# Patient Record
Sex: FEMALE | Race: WHITE | NOT HISPANIC OR LATINO | URBAN - METROPOLITAN AREA
[De-identification: names, ages, dates, MRNs, and addresses within clinical notes are randomized per-mention and may not be internally consistent; named-entity substitution may affect disease eponyms.]

---

## 2018-07-11 ENCOUNTER — OUTPATIENT (OUTPATIENT)
Dept: OUTPATIENT SERVICES | Facility: HOSPITAL | Age: 68
LOS: 1 days | End: 2018-07-11
Payer: COMMERCIAL

## 2018-07-11 VITALS
OXYGEN SATURATION: 100 % | HEIGHT: 65.5 IN | SYSTOLIC BLOOD PRESSURE: 141 MMHG | RESPIRATION RATE: 14 BRPM | TEMPERATURE: 98 F | WEIGHT: 126.99 LBS | DIASTOLIC BLOOD PRESSURE: 90 MMHG | HEART RATE: 72 BPM

## 2018-07-11 DIAGNOSIS — C50.512 MALIGNANT NEOPLASM OF LOWER-OUTER QUADRANT OF LEFT FEMALE BREAST: ICD-10-CM

## 2018-07-11 DIAGNOSIS — Z01.818 ENCOUNTER FOR OTHER PREPROCEDURAL EXAMINATION: ICD-10-CM

## 2018-07-11 PROCEDURE — G0463: CPT

## 2018-07-11 RX ORDER — LIDOCAINE HCL 20 MG/ML
0.2 VIAL (ML) INJECTION ONCE
Qty: 0 | Refills: 0 | Status: DISCONTINUED | OUTPATIENT
Start: 2018-07-18 | End: 2018-08-02

## 2018-07-11 RX ORDER — SODIUM CHLORIDE 9 MG/ML
3 INJECTION INTRAMUSCULAR; INTRAVENOUS; SUBCUTANEOUS EVERY 8 HOURS
Qty: 0 | Refills: 0 | Status: DISCONTINUED | OUTPATIENT
Start: 2018-07-18 | End: 2018-08-02

## 2018-07-11 NOTE — H&P PST ADULT - HISTORY OF PRESENT ILLNESS
69 yo female. h/o HTN.  recent routine screening breast imaging study revealed abnormality, biopsy positive for malignancy. presents to PST scheduled for left breast lumpectomy.

## 2018-07-11 NOTE — H&P PST ADULT - PROBLEM SELECTOR PLAN 1
left Makayla  localization lumpectomy  left axillary sentinel lymph node biopsy  left possible axillary lymph node dissection

## 2018-07-11 NOTE — H&P PST ADULT - ATTENDING COMMENTS
The patient is a 67 year old female recently diagnosed with left breast invasive ductal carcinoma.  She presents to undergo a left murray  localization lumpectomy, left axillary sentinel lymph node biopsy, possible left axillary lymph node dissection.

## 2018-07-11 NOTE — H&P PST ADULT - PROBLEM SELECTOR PROBLEM 1
Malignant neoplasm of lower-outer quadrant of left female breast, unspecified estrogen receptor status

## 2018-07-12 ENCOUNTER — OUTPATIENT (OUTPATIENT)
Dept: OUTPATIENT SERVICES | Facility: HOSPITAL | Age: 68
LOS: 1 days | End: 2018-07-12
Payer: COMMERCIAL

## 2018-07-12 ENCOUNTER — RESULT REVIEW (OUTPATIENT)
Age: 68
End: 2018-07-12

## 2018-07-12 DIAGNOSIS — C50.512 MALIGNANT NEOPLASM OF LOWER-OUTER QUADRANT OF LEFT FEMALE BREAST: ICD-10-CM

## 2018-07-12 LAB
HCT VFR BLD CALC: 38.4 % — SIGNIFICANT CHANGE UP (ref 34.5–45)
HGB BLD-MCNC: 12.6 G/DL — SIGNIFICANT CHANGE UP (ref 11.5–15.5)
MCHC RBC-ENTMCNC: 27.9 PG — SIGNIFICANT CHANGE UP (ref 27–34)
MCHC RBC-ENTMCNC: 32.8 GM/DL — SIGNIFICANT CHANGE UP (ref 32–36)
MCV RBC AUTO: 85.1 FL — SIGNIFICANT CHANGE UP (ref 80–100)
PLATELET # BLD AUTO: 207 K/UL — SIGNIFICANT CHANGE UP (ref 150–400)
RBC # BLD: 4.51 M/UL — SIGNIFICANT CHANGE UP (ref 3.8–5.2)
RBC # FLD: 14.3 % — SIGNIFICANT CHANGE UP (ref 10.3–14.5)
WBC # BLD: 4.85 K/UL — SIGNIFICANT CHANGE UP (ref 3.8–10.5)
WBC # FLD AUTO: 4.85 K/UL — SIGNIFICANT CHANGE UP (ref 3.8–10.5)

## 2018-07-12 PROCEDURE — 85027 COMPLETE CBC AUTOMATED: CPT

## 2018-07-13 ENCOUNTER — TRANSCRIPTION ENCOUNTER (OUTPATIENT)
Age: 68
End: 2018-07-13

## 2018-07-13 ENCOUNTER — OUTPATIENT (OUTPATIENT)
Dept: OUTPATIENT SERVICES | Facility: HOSPITAL | Age: 68
LOS: 1 days | End: 2018-07-13
Payer: COMMERCIAL

## 2018-07-13 ENCOUNTER — APPOINTMENT (OUTPATIENT)
Dept: ULTRASOUND IMAGING | Facility: IMAGING CENTER | Age: 68
End: 2018-07-13
Payer: COMMERCIAL

## 2018-07-13 DIAGNOSIS — R92.8 OTHER ABNORMAL AND INCONCLUSIVE FINDINGS ON DIAGNOSTIC IMAGING OF BREAST: ICD-10-CM

## 2018-07-13 PROCEDURE — 19285 PERQ DEV BREAST 1ST US IMAG: CPT

## 2018-07-13 PROCEDURE — 19285 PERQ DEV BREAST 1ST US IMAG: CPT | Mod: 53,LT

## 2018-07-13 PROCEDURE — C1739: CPT

## 2018-07-17 ENCOUNTER — TRANSCRIPTION ENCOUNTER (OUTPATIENT)
Age: 68
End: 2018-07-17

## 2018-07-18 ENCOUNTER — APPOINTMENT (OUTPATIENT)
Dept: NUCLEAR MEDICINE | Facility: HOSPITAL | Age: 68
End: 2018-07-18

## 2018-07-18 ENCOUNTER — OUTPATIENT (OUTPATIENT)
Dept: OUTPATIENT SERVICES | Facility: HOSPITAL | Age: 68
LOS: 1 days | End: 2018-07-18
Payer: COMMERCIAL

## 2018-07-18 ENCOUNTER — RESULT REVIEW (OUTPATIENT)
Age: 68
End: 2018-07-18

## 2018-07-18 VITALS
OXYGEN SATURATION: 100 % | RESPIRATION RATE: 20 BRPM | HEART RATE: 90 BPM | TEMPERATURE: 98 F | DIASTOLIC BLOOD PRESSURE: 87 MMHG | WEIGHT: 126.99 LBS | HEIGHT: 65.5 IN | SYSTOLIC BLOOD PRESSURE: 141 MMHG

## 2018-07-18 VITALS
SYSTOLIC BLOOD PRESSURE: 110 MMHG | OXYGEN SATURATION: 98 % | HEART RATE: 74 BPM | RESPIRATION RATE: 16 BRPM | DIASTOLIC BLOOD PRESSURE: 60 MMHG

## 2018-07-18 DIAGNOSIS — Z01.818 ENCOUNTER FOR OTHER PREPROCEDURAL EXAMINATION: ICD-10-CM

## 2018-07-18 DIAGNOSIS — C50.512 MALIGNANT NEOPLASM OF LOWER-OUTER QUADRANT OF LEFT FEMALE BREAST: ICD-10-CM

## 2018-07-18 PROCEDURE — 88304 TISSUE EXAM BY PATHOLOGIST: CPT | Mod: 26

## 2018-07-18 PROCEDURE — 88304 TISSUE EXAM BY PATHOLOGIST: CPT

## 2018-07-18 PROCEDURE — 88307 TISSUE EXAM BY PATHOLOGIST: CPT

## 2018-07-18 PROCEDURE — 76098 X-RAY EXAM SURGICAL SPECIMEN: CPT | Mod: 26

## 2018-07-18 PROCEDURE — A9541: CPT

## 2018-07-18 PROCEDURE — 76098 X-RAY EXAM SURGICAL SPECIMEN: CPT

## 2018-07-18 PROCEDURE — 19301 PARTIAL MASTECTOMY: CPT | Mod: LT

## 2018-07-18 PROCEDURE — 38525 BIOPSY/REMOVAL LYMPH NODES: CPT | Mod: LT

## 2018-07-18 PROCEDURE — 38792 RA TRACER ID OF SENTINL NODE: CPT

## 2018-07-18 PROCEDURE — 88307 TISSUE EXAM BY PATHOLOGIST: CPT | Mod: 26

## 2018-07-18 RX ORDER — CELECOXIB 200 MG/1
200 CAPSULE ORAL ONCE
Qty: 0 | Refills: 0 | Status: COMPLETED | OUTPATIENT
Start: 2018-07-18 | End: 2018-07-18

## 2018-07-18 RX ORDER — ACETAMINOPHEN 500 MG
1000 TABLET ORAL ONCE
Qty: 0 | Refills: 0 | Status: COMPLETED | OUTPATIENT
Start: 2018-07-18 | End: 2018-07-18

## 2018-07-18 RX ORDER — HYDROMORPHONE HYDROCHLORIDE 2 MG/ML
0.25 INJECTION INTRAMUSCULAR; INTRAVENOUS; SUBCUTANEOUS
Qty: 0 | Refills: 0 | Status: DISCONTINUED | OUTPATIENT
Start: 2018-07-18 | End: 2018-07-18

## 2018-07-18 RX ORDER — OXYCODONE HYDROCHLORIDE 5 MG/1
5 TABLET ORAL ONCE
Qty: 0 | Refills: 0 | Status: DISCONTINUED | OUTPATIENT
Start: 2018-07-18 | End: 2018-07-18

## 2018-07-18 RX ORDER — CELECOXIB 200 MG/1
200 CAPSULE ORAL ONCE
Qty: 0 | Refills: 0 | Status: DISCONTINUED | OUTPATIENT
Start: 2018-07-18 | End: 2018-08-02

## 2018-07-18 RX ORDER — ONDANSETRON 8 MG/1
4 TABLET, FILM COATED ORAL ONCE
Qty: 0 | Refills: 0 | Status: DISCONTINUED | OUTPATIENT
Start: 2018-07-18 | End: 2018-08-02

## 2018-07-18 RX ORDER — SODIUM CHLORIDE 9 MG/ML
1000 INJECTION, SOLUTION INTRAVENOUS
Qty: 0 | Refills: 0 | Status: DISCONTINUED | OUTPATIENT
Start: 2018-07-18 | End: 2018-08-02

## 2018-07-18 RX ADMIN — Medication 1000 MILLIGRAM(S): at 07:35

## 2018-07-18 RX ADMIN — CELECOXIB 200 MILLIGRAM(S): 200 CAPSULE ORAL at 07:34

## 2018-07-18 NOTE — ASU PREOP CHECKLIST - TO WHOM
WILIAN Padilla from WILIAN Lu @ 31 WILIAN Padilla from WILIAN Lu @ 0835/ Report received from DARRICK Burrows RN Admitting

## 2018-07-18 NOTE — ASU DISCHARGE PLAN (ADULT/PEDIATRIC). - MEDICATION SUMMARY - MEDICATIONS TO TAKE
I will START or STAY ON the medications listed below when I get home from the hospital:    supplements: MVI, calcium, lutein  -- Indication: For home med    losartan-hydroCHLOROthiazide 100 mg-25 mg oral tablet  -- 1 tab(s) by mouth once a day  -- Indication: For home med

## 2018-07-18 NOTE — ASU DISCHARGE PLAN (ADULT/PEDIATRIC). - SPECIAL INSTRUCTIONS
Please refer to post-operative instructions form     WOUND: Please keep incisions clean and dry. Please do not scrub or rub incisions. Please to do not apply lotion or powder on incisions.   BATH: Please do not submerge wound under water for at least 1 week. You may shower.  ACTIVITY: No heavy lifting or straining until your follow up appointment. Otherwise, you may return to your usual level of physical activity. If you are taking narcotic pain medication (such as Percocet) DO NOT drive a car, operate machinery or make important decisions.  DIET: Return to your usual diet.  NOTIFY YOUR SURGEON IF: You have any bleeding that does not stop, any pus draining from your wound(s), any fever (over 100.4 F) or chills, persistent nausea/vomiting, persistent diarrhea, or if your pain is not controlled on your discharge pain medications.  FOLLOW-UP: Please follow-up with your surgeon, within 7-10days  following discharge- please call to schedule an appointment.

## 2018-07-18 NOTE — BRIEF OPERATIVE NOTE - POST-OP DX
Malignant neoplasm of lower-outer quadrant of left breast of female, estrogen receptor positive  07/18/2018  left 4:00  Active  Palleschi, Susan M

## 2018-07-18 NOTE — BRIEF OPERATIVE NOTE - SPECIMENS
left axillary sentinel lymph node x  , left 4:00 breast lumpectomy, left 4:00 breast core biopsy scar

## 2018-07-18 NOTE — ASU DISCHARGE PLAN (ADULT/PEDIATRIC). - FOLLOWUP APPOINTMENT CLINIC/PHYSICIAN
Please call to make follow-up appointment with Dr. Palleschi within 7-10days   Office Contact: [phone]

## 2018-07-18 NOTE — PRE-ANESTHESIA EVALUATION ADULT - NSANTHOSAYNRD_GEN_A_CORE
No. LUCILA screening performed.  STOP BANG Legend: 0-2 = LOW Risk; 3-4 = INTERMEDIATE Risk; 5-8 = HIGH Risk

## 2018-07-18 NOTE — BRIEF OPERATIVE NOTE - PROCEDURE
<<-----Click on this checkbox to enter Procedure Lumpectomy of left breast with sentinel node biopsy  07/18/2018  left 4:00 with murray  localization  Active  [facility]

## 2018-07-20 LAB — SURGICAL PATHOLOGY STUDY: SIGNIFICANT CHANGE UP

## 2018-08-28 ENCOUNTER — OUTPATIENT (OUTPATIENT)
Dept: OUTPATIENT SERVICES | Facility: HOSPITAL | Age: 68
LOS: 1 days | Discharge: HOME | End: 2018-08-28
Payer: COMMERCIAL

## 2018-08-28 ENCOUNTER — APPOINTMENT (OUTPATIENT)
Dept: RADIATION ONCOLOGY | Facility: CLINIC | Age: 68
End: 2018-08-28

## 2018-08-28 VITALS
WEIGHT: 126.87 LBS | SYSTOLIC BLOOD PRESSURE: 155 MMHG | HEART RATE: 75 BPM | OXYGEN SATURATION: 97 % | RESPIRATION RATE: 17 BRPM | BODY MASS INDEX: 21.14 KG/M2 | HEIGHT: 65 IN | DIASTOLIC BLOOD PRESSURE: 92 MMHG | TEMPERATURE: 98.5 F

## 2018-08-28 DIAGNOSIS — Z87.39 PERSONAL HISTORY OF OTHER DISEASES OF THE MUSCULOSKELETAL SYSTEM AND CONNECTIVE TISSUE: ICD-10-CM

## 2018-08-28 DIAGNOSIS — I10 ESSENTIAL (PRIMARY) HYPERTENSION: ICD-10-CM

## 2018-08-28 DIAGNOSIS — Z87.891 PERSONAL HISTORY OF NICOTINE DEPENDENCE: ICD-10-CM

## 2018-08-28 PROCEDURE — 77262 THER RADIOLOGY TX PLNG INTRM: CPT

## 2018-08-28 RX ORDER — ANASTROZOLE TABLETS 1 MG/1
1 TABLET ORAL DAILY
Refills: 0 | Status: ACTIVE | COMMUNITY
Start: 2018-08-28

## 2018-09-04 PROCEDURE — 77290 THER RAD SIMULAJ FIELD CPLX: CPT | Mod: 26

## 2018-09-04 PROCEDURE — 77333 RADIATION TREATMENT AID(S): CPT | Mod: 26

## 2018-09-11 PROCEDURE — 77300 RADIATION THERAPY DOSE PLAN: CPT | Mod: 26

## 2018-09-11 PROCEDURE — 77334 RADIATION TREATMENT AID(S): CPT | Mod: 26

## 2018-09-11 PROCEDURE — 77295 3-D RADIOTHERAPY PLAN: CPT | Mod: 26

## 2018-09-14 PROCEDURE — 77280 THER RAD SIMULAJ FIELD SMPL: CPT | Mod: 26

## 2018-09-19 PROCEDURE — G6017: CPT

## 2018-09-20 PROCEDURE — G6017: CPT

## 2018-09-21 PROCEDURE — G6017: CPT

## 2018-09-24 PROCEDURE — G6017: CPT

## 2018-09-25 PROCEDURE — 77427 RADIATION TX MANAGEMENT X5: CPT

## 2018-09-25 PROCEDURE — G6017: CPT

## 2018-09-26 PROCEDURE — G6017: CPT

## 2018-09-27 PROCEDURE — G6017: CPT

## 2018-09-28 PROCEDURE — G6017: CPT

## 2018-10-01 PROCEDURE — G6017: CPT

## 2018-10-01 NOTE — HISTORY OF PRESENT ILLNESS
[FreeTextEntry1] : Ms. SHAYLA RODRIGUEZ is a 68 year old female with a diagnosis of pT1b pN0 ER/IN positive, HER-2 negative invasive duct carcinoma of the left breast She received a s Makayla  lumpectomy and left axillary sentinel lymph node biopsy and is currently receiving hollie adjuvant radiation to the left breast\par \par She is feeling well and denies any pain or fatigue.  She is using Eucurin on her  breast and has not noticed any changes yet.

## 2018-10-01 NOTE — DISEASE MANAGEMENT
[Pathological] : TNM Stage: p [I] : I [TTNM] : 1b [NTNM] : o [MTNM] : 0 [de-identified] : 1060cGy [de-identified] : 6141oHo [de-identified] : left breast

## 2018-10-01 NOTE — REVIEW OF SYSTEMS
[Alopecia: Grade 0] : Alopecia: Grade 0 [Pruritus: Grade 0] : Pruritus: Grade 0 [Skin Atrophy: Grade 0] : Skin Atrophy: Grade 0 [Skin Hyperpigmentation: Grade 1 - Hyperpigmentation covering <10% BSA; no psychosocial impact] : Skin Hyperpigmentation: Grade 1 - Hyperpigmentation covering <10% BSA; no psychosocial impact [Skin Induration: Grade 0] : Skin Induration: Grade 0 [Dermatitis Radiation: Grade 0] : Dermatitis Radiation: Grade 0

## 2018-10-02 PROCEDURE — G6017: CPT

## 2018-10-02 PROCEDURE — 77427 RADIATION TX MANAGEMENT X5: CPT

## 2018-10-03 PROCEDURE — G6017: CPT

## 2018-10-03 NOTE — DISEASE MANAGEMENT
[Pathological] : TNM Stage: p [I] : I [TTNM] : 1b [NTNM] : o [MTNM] : 0 [de-identified] : 1739zHj [de-identified] : 1920wCg [de-identified] : left breast

## 2018-10-03 NOTE — VITALS
[Maximal Pain Intensity: 0/10] : 0/10 [Least Pain Intensity: 0/10] : 0/10 [Pain Description/Quality: ___] : Pain description/quality: [unfilled] [NoTreatment Scheduled] : no treatment scheduled [90: Able to carry normal activity; minor signs or symptoms of disease.] : 90: Able to carry normal activity; minor signs or symptoms of disease.  [ECOG Performance Status: 1 - Restricted in physically strenuous activity but ambulatory and able to carry out work of a light or sedentary nature] : Performance Status: 1 - Restricted in physically strenuous activity but ambulatory and able to carry out work of a light or sedentary nature, e.g., light house work, office work

## 2018-10-03 NOTE — PHYSICAL EXAM
[Breast Palpation Mass] : no palpable masses [Normal] : well developed, well nourished, in no acute distress [Breast Abnormal Lactation (Galactorrhea)] : no nipple discharge [de-identified] : well healed surgical scars. mild diffuse erythema of the left breast.

## 2018-10-03 NOTE — HISTORY OF PRESENT ILLNESS
[FreeTextEntry1] : Ms. SHAYLA RODRIGUEZ is a 68 year old female with a diagnosis of pT1b pN0 ER/NE positive, HER-2 negative invasive duct carcinoma of the left breast.   She underwent left breast  lumpectomy and left axillary sentinel lymph node biopsy and is currently receiving  radiation to the left breast\par \par She is feeling well and denies any pain or fatigue.  She is using Eucurin on her  breast and has not noticed any changes yet. She denies discharge.

## 2018-10-04 PROCEDURE — G6017: CPT

## 2018-10-05 PROCEDURE — G6017: CPT

## 2018-10-08 PROCEDURE — G6017: CPT

## 2018-10-09 PROCEDURE — G6017: CPT

## 2018-10-09 PROCEDURE — 77427 RADIATION TX MANAGEMENT X5: CPT

## 2018-10-10 PROCEDURE — G6017: CPT

## 2018-11-27 ENCOUNTER — APPOINTMENT (OUTPATIENT)
Dept: RADIATION ONCOLOGY | Facility: CLINIC | Age: 68
End: 2018-11-27
Payer: COMMERCIAL

## 2018-11-27 VITALS
HEART RATE: 79 BPM | HEIGHT: 65 IN | DIASTOLIC BLOOD PRESSURE: 95 MMHG | SYSTOLIC BLOOD PRESSURE: 153 MMHG | RESPIRATION RATE: 16 BRPM | OXYGEN SATURATION: 100 % | BODY MASS INDEX: 20.99 KG/M2 | WEIGHT: 126 LBS

## 2018-11-27 PROCEDURE — 99024 POSTOP FOLLOW-UP VISIT: CPT | Mod: GC

## 2018-11-29 NOTE — PHYSICAL EXAM
[Sclera] : the sclera and conjunctiva were normal [Hearing Threshold Finger Rub Not [place]] : hearing was normal [Normal] : supple with no thyromegaly or masses appreciated [Breast Palpation Mass] : no palpable masses [Breast Abnormal Lactation (Galactorrhea)] : no nipple discharge [No UE Edema] : there is no upper extremity edema [Axillary Lymph Nodes Enlarged Bilaterally] : axillary [de-identified] : residual mild hyperpigmentation of left breast

## 2018-11-29 NOTE — DISEASE MANAGEMENT
[Pathological] : TNM Stage: p [I] : I [TTNM] : 1b [NTNM] : o [MTNM] : 0 [de-identified] : 5442eSk [de-identified] : 0127vHz [de-identified] : left breast

## 2018-11-29 NOTE — HISTORY OF PRESENT ILLNESS
[FreeTextEntry1] : Ms. Chen is a 68-year-old female  with ER+ PgR+ Her2 negative Stage IA pT1bN0 moderately  differentiated invasive ductal carcinoma of the left breast, s/p lumpectomy and sentinel node biopsy with negative surgical margins followed by adjuvant radiation, 4240cGy/16 fxns, completed 10/10/18. \par \par She has a mammogram scheduled for next week. She continues to follow with medical and surgical oncologist.  She has some minimal swelling of the treated breast, but no other complaints. She is taking anastrazole without issues. Using eucerin cream.

## 2018-11-29 NOTE — REVIEW OF SYSTEMS
[Negative] : Allergic/Immunologic [Breast Pain: Grade 0] : Breast Pain: Grade 0 [Skin Hyperpigmentation: Grade 1 - Hyperpigmentation covering <10% BSA; no psychosocial impact] : Skin Hyperpigmentation: Grade 1 - Hyperpigmentation covering <10% BSA; no psychosocial impact [Skin Induration: Grade 0] : Skin Induration: Grade 0 [Dermatitis Radiation: Grade 0] : Dermatitis Radiation: Grade 0

## 2018-11-29 NOTE — HISTORY OF PRESENT ILLNESS
[FreeTextEntry1] : Ms. SHAYLA RODRIGUEZ is a 68 year old female with a diagnosis of pT1b pN0 ER/MO positive, HER-2 negative invasive duct carcinoma of the left breast.   She underwent left breast  lumpectomy and left axillary sentinel lymph node biopsy and is currently receiving  radiation to the left breast\par \par She is feeling well and denies any pain or fatigue.  C/o pruritus to chest wall.  She is using Eucurin on her  breast and has not noticed any changes yet. She denies discharge.

## 2019-06-11 ENCOUNTER — TRANSCRIPTION ENCOUNTER (OUTPATIENT)
Age: 69
End: 2019-06-11

## 2019-06-11 ENCOUNTER — APPOINTMENT (OUTPATIENT)
Dept: RADIATION ONCOLOGY | Facility: CLINIC | Age: 69
End: 2019-06-11
Payer: COMMERCIAL

## 2019-06-11 VITALS
BODY MASS INDEX: 20.62 KG/M2 | RESPIRATION RATE: 16 BRPM | SYSTOLIC BLOOD PRESSURE: 150 MMHG | OXYGEN SATURATION: 100 % | WEIGHT: 123.9 LBS | DIASTOLIC BLOOD PRESSURE: 88 MMHG | HEART RATE: 68 BPM

## 2019-06-11 PROCEDURE — 99213 OFFICE O/P EST LOW 20 MIN: CPT

## 2019-06-11 NOTE — PHYSICAL EXAM
[Normal] : no respiratory distress, lungs were clear to auscultation bilaterally [Heart Sounds] : normal S1 and S2 [Heart Rate And Rhythm] : heart rate and rhythm were normal [Breast Appearance] : normal in appearance [Symmetric] : breasts are symmetric [Breast Palpation Mass] : no palpable masses [Breast Abnormal Lactation (Galactorrhea)] : no nipple discharge [No UE Edema] : there is no upper extremity edema [Cervical Lymph Nodes Enlarged Anterior Bilaterally] : anterior cervical [Cervical Lymph Nodes Enlarged Posterior Bilaterally] : posterior cervical [Skin Color & Pigmentation] : normal skin color and pigmentation [Axillary Lymph Nodes Enlarged Bilaterally] : axillary [Supraclavicular Lymph Nodes Enlarged Bilaterally] : supraclavicular [Oriented To Time, Place, And Person] : oriented to person, place, and time

## 2019-06-11 NOTE — VITALS
[Least Pain Intensity: 0/10] : 0/10 [Maximal Pain Intensity: 0/10] : 0/10 [100: Normal, no complaints, no evidence of disease.] : 100: Normal, no complaints, no evidence of disease. [ECOG Performance Status: 0 - Fully active, able to carry on all pre-disease performance without restriction] : Performance Status: 0 - Fully active, able to carry on all pre-disease performance without restriction

## 2019-06-16 NOTE — REVIEW OF SYSTEMS
[Negative] : Constitutional [Fatigue: Grade 0] : Fatigue: Grade 0 [Breast Pain: Grade 0] : Breast Pain: Grade 0 [Cough: Grade 0] : Cough: Grade 0 [Dyspnea: Grade 0] : Dyspnea: Grade 0 [Pruritus: Grade 0] : Pruritus: Grade 0 [Skin Hyperpigmentation: Grade 0] : Skin Hyperpigmentation: Grade 0 [Dermatitis Radiation: Grade 0] : Dermatitis Radiation: Grade 0 [Skin Induration: Grade 0] : Skin Induration: Grade 0

## 2019-06-16 NOTE — HISTORY OF PRESENT ILLNESS
[FreeTextEntry1] : Ms. Chen is a 68-year-old female with ER+ PgR+ Her2 negative Stage IA pT1bN0 moderately  differentiated invasive ductal carcinoma of the left breast, s/p lumpectomy and sentinel node biopsy with negative surgical margins followed by adjuvant radiation, 4240cGy/16 fractions, completed 10/10/18. \par \par Today she states that she feels well and continues on anastrazole with good tolerance. Denies breast pain or nipple discharge.\par  She had a mammogram/sonogram 5/17/19 benign results. She also had a bone density showing osteopenia and a colonoscopy in April showing diverticulosis and a diminutive polyp. She saw her gynecologist and (s). Palleschi and has an appointment to see  Dr. Alcazar next week.\par \par

## 2019-08-14 NOTE — H&P PST ADULT - NSANTHBPHIGHRD_ENT_A_CORE
Thank you for choosing Deepti Holliday MD at [place] [address]  To Do: Jb Ruggiero E Jair  1. Please take meds as directed. [facility] is located in [address]. Monday, Tuesday & Friday – 8 a.m. to 4 p.m. Wednesday, Thursday – 7 a.m. to 3 p.m. those potential risks and we strive to make you healthier and to improve your quality of life.     Referrals, and Radiology Information:    If your insurance requires a referral to a specialist, please allow 5 business days to process your referral request. true for older adults)  · Changes in lifestyle or daily routine, including pregnancy, aging, work, and travel  · Frequent use or misuse of laxatives  · Ignoring the urge to have a bowel movement or delaying it until later  · Medicines, such as certain pres dependence, they are treating the symptoms. So your constipation may return if you don't make other changes. Talk with your healthcare provider or pharmacist if you have questions. Prescription pain medicines can cause constipation.  If you are taking this Yes

## 2020-02-04 ENCOUNTER — APPOINTMENT (OUTPATIENT)
Dept: RADIATION ONCOLOGY | Facility: CLINIC | Age: 70
End: 2020-02-04
Payer: COMMERCIAL

## 2020-02-04 VITALS
RESPIRATION RATE: 16 BRPM | HEART RATE: 77 BPM | OXYGEN SATURATION: 100 % | SYSTOLIC BLOOD PRESSURE: 139 MMHG | BODY MASS INDEX: 20.84 KG/M2 | DIASTOLIC BLOOD PRESSURE: 86 MMHG | WEIGHT: 125.22 LBS | TEMPERATURE: 97.9 F

## 2020-02-04 PROCEDURE — 99213 OFFICE O/P EST LOW 20 MIN: CPT

## 2020-02-18 NOTE — PHYSICAL EXAM
[] : no respiratory distress [General Appearance - Alert] : alert [Breast Abnormal Lactation (Galactorrhea)] : no nipple discharge [No UE Edema] : there is no upper extremity edema [Skin Color & Pigmentation] : normal skin color and pigmentation [Oriented To Time, Place, And Person] : oriented to person, place, and time [Cervical Lymph Nodes Enlarged Posterior Bilaterally] : posterior cervical [Supraclavicular Lymph Nodes Enlarged Bilaterally] : supraclavicular [Cervical Lymph Nodes Enlarged Anterior Bilaterally] : anterior cervical [Breast Palpation Mass] : no palpable masses [Normal] : supple with no thyromegaly or masses appreciated [Auscultation Breath Sounds / Voice Sounds] : lungs were clear to auscultation bilaterally

## 2020-02-18 NOTE — HISTORY OF PRESENT ILLNESS
[FreeTextEntry1] : Ms. Chen is a 69 year-old female with ER+ PgR+ Her2 negative Stage IA pT1bN0 moderately  differentiated invasive ductal carcinoma of the left breast, s/p lumpectomy and sentinel node biopsy with negative surgical margins followed by adjuvant radiation, 4240cGy/16 fractions, completed 10/10/18. \par \par At her last visit on 6/11/19  she was doing well and continued on anastrazole under the care of Dr. Alcazar. \par \par She had a left breast screening mammo/sono in 11/2019 with benign findings per patient( [facility] [place] [facility]).\par \par Today she states that she feels well. Denies breast pain. She continues on anastrazole with good tolerance. She saw Dr. Alcazar 2 weeks ago and will RTO in April. She will  see Dr. Palleschi  in May and will have bilateral mammogram/sonogram  at that time.\par \par \par \par

## 2020-02-18 NOTE — REVIEW OF SYSTEMS
[Fatigue: Grade 0] : Fatigue: Grade 0 [Breast Pain: Grade 0] : Breast Pain: Grade 0 [Dyspnea: Grade 0] : Dyspnea: Grade 0 [Pruritus: Grade 0] : Pruritus: Grade 0 [Skin Hyperpigmentation: Grade 0] : Skin Hyperpigmentation: Grade 0 [Skin Induration: Grade 0] : Skin Induration: Grade 0 [Dermatitis Radiation: Grade 0] : Dermatitis Radiation: Grade 0

## 2020-09-23 ENCOUNTER — APPOINTMENT (OUTPATIENT)
Dept: RADIATION ONCOLOGY | Facility: CLINIC | Age: 70
End: 2020-09-23

## 2020-10-17 ENCOUNTER — INPATIENT (INPATIENT)
Facility: HOSPITAL | Age: 70
LOS: 1 days | Discharge: HOME | DRG: 312 | End: 2020-10-19
Attending: INTERNAL MEDICINE | Admitting: INTERNAL MEDICINE
Payer: COMMERCIAL

## 2020-10-17 VITALS
DIASTOLIC BLOOD PRESSURE: 95 MMHG | RESPIRATION RATE: 16 BRPM | OXYGEN SATURATION: 99 % | WEIGHT: 119.93 LBS | HEIGHT: 65.5 IN | SYSTOLIC BLOOD PRESSURE: 157 MMHG | HEART RATE: 94 BPM

## 2020-10-17 DIAGNOSIS — R55 SYNCOPE AND COLLAPSE: ICD-10-CM

## 2020-10-17 LAB
ALBUMIN SERPL ELPH-MCNC: 4.3 G/DL — SIGNIFICANT CHANGE UP (ref 3.3–5)
ALP SERPL-CCNC: 68 U/L — SIGNIFICANT CHANGE UP (ref 40–120)
ALT FLD-CCNC: 29 U/L — SIGNIFICANT CHANGE UP (ref 10–45)
ANION GAP SERPL CALC-SCNC: 13 MMOL/L — SIGNIFICANT CHANGE UP (ref 5–17)
APPEARANCE UR: CLEAR — SIGNIFICANT CHANGE UP
AST SERPL-CCNC: 39 U/L — SIGNIFICANT CHANGE UP (ref 10–40)
BASOPHILS # BLD AUTO: 0.04 K/UL — SIGNIFICANT CHANGE UP (ref 0–0.2)
BASOPHILS NFR BLD AUTO: 0.6 % — SIGNIFICANT CHANGE UP (ref 0–2)
BILIRUB SERPL-MCNC: 0.2 MG/DL — SIGNIFICANT CHANGE UP (ref 0.2–1.2)
BILIRUB UR-MCNC: NEGATIVE — SIGNIFICANT CHANGE UP
BUN SERPL-MCNC: 14 MG/DL — SIGNIFICANT CHANGE UP (ref 7–23)
CALCIUM SERPL-MCNC: 10.5 MG/DL — SIGNIFICANT CHANGE UP (ref 8.4–10.5)
CHLORIDE SERPL-SCNC: 98 MMOL/L — SIGNIFICANT CHANGE UP (ref 96–108)
CO2 SERPL-SCNC: 27 MMOL/L — SIGNIFICANT CHANGE UP (ref 22–31)
COLOR SPEC: SIGNIFICANT CHANGE UP
CORTIS AM PEAK SERPL-MCNC: 21.3 UG/DL — HIGH (ref 6–18.4)
CREAT SERPL-MCNC: 0.67 MG/DL — SIGNIFICANT CHANGE UP (ref 0.5–1.3)
D DIMER BLD IA.RAPID-MCNC: 5208 NG/ML DDU — HIGH
DIFF PNL FLD: NEGATIVE — SIGNIFICANT CHANGE UP
EOSINOPHIL # BLD AUTO: 0.12 K/UL — SIGNIFICANT CHANGE UP (ref 0–0.5)
EOSINOPHIL NFR BLD AUTO: 1.7 % — SIGNIFICANT CHANGE UP (ref 0–6)
ETHANOL SERPL-MCNC: SIGNIFICANT CHANGE UP MG/DL (ref 0–10)
GLUCOSE SERPL-MCNC: 114 MG/DL — HIGH (ref 70–99)
GLUCOSE UR QL: NEGATIVE — SIGNIFICANT CHANGE UP
HCT VFR BLD CALC: 41.1 % — SIGNIFICANT CHANGE UP (ref 34.5–45)
HGB BLD-MCNC: 13.4 G/DL — SIGNIFICANT CHANGE UP (ref 11.5–15.5)
IMM GRANULOCYTES NFR BLD AUTO: 0.7 % — SIGNIFICANT CHANGE UP (ref 0–1.5)
KETONES UR-MCNC: NEGATIVE — SIGNIFICANT CHANGE UP
LEUKOCYTE ESTERASE UR-ACNC: NEGATIVE — SIGNIFICANT CHANGE UP
LYMPHOCYTES # BLD AUTO: 2.09 K/UL — SIGNIFICANT CHANGE UP (ref 1–3.3)
LYMPHOCYTES # BLD AUTO: 29 % — SIGNIFICANT CHANGE UP (ref 13–44)
MCHC RBC-ENTMCNC: 28 PG — SIGNIFICANT CHANGE UP (ref 27–34)
MCHC RBC-ENTMCNC: 32.6 GM/DL — SIGNIFICANT CHANGE UP (ref 32–36)
MCV RBC AUTO: 85.8 FL — SIGNIFICANT CHANGE UP (ref 80–100)
MONOCYTES # BLD AUTO: 0.49 K/UL — SIGNIFICANT CHANGE UP (ref 0–0.9)
MONOCYTES NFR BLD AUTO: 6.8 % — SIGNIFICANT CHANGE UP (ref 2–14)
NEUTROPHILS # BLD AUTO: 4.41 K/UL — SIGNIFICANT CHANGE UP (ref 1.8–7.4)
NEUTROPHILS NFR BLD AUTO: 61.2 % — SIGNIFICANT CHANGE UP (ref 43–77)
NITRITE UR-MCNC: NEGATIVE — SIGNIFICANT CHANGE UP
NRBC # BLD: 0 /100 WBCS — SIGNIFICANT CHANGE UP (ref 0–0)
NT-PROBNP SERPL-SCNC: 140 PG/ML — SIGNIFICANT CHANGE UP (ref 0–300)
PH UR: 7 — SIGNIFICANT CHANGE UP (ref 5–8)
PLATELET # BLD AUTO: 207 K/UL — SIGNIFICANT CHANGE UP (ref 150–400)
POTASSIUM SERPL-MCNC: 3.7 MMOL/L — SIGNIFICANT CHANGE UP (ref 3.5–5.3)
POTASSIUM SERPL-SCNC: 3.7 MMOL/L — SIGNIFICANT CHANGE UP (ref 3.5–5.3)
PROT SERPL-MCNC: 7.6 G/DL — SIGNIFICANT CHANGE UP (ref 6–8.3)
PROT UR-MCNC: NEGATIVE — SIGNIFICANT CHANGE UP
RBC # BLD: 4.79 M/UL — SIGNIFICANT CHANGE UP (ref 3.8–5.2)
RBC # FLD: 13.6 % — SIGNIFICANT CHANGE UP (ref 10.3–14.5)
SARS-COV-2 IGG SERPL QL IA: NEGATIVE — SIGNIFICANT CHANGE UP
SARS-COV-2 IGM SERPL IA-ACNC: <0.1 INDEX — SIGNIFICANT CHANGE UP
SARS-COV-2 RNA SPEC QL NAA+PROBE: SIGNIFICANT CHANGE UP
SODIUM SERPL-SCNC: 138 MMOL/L — SIGNIFICANT CHANGE UP (ref 135–145)
SP GR SPEC: 1.03 — HIGH (ref 1.01–1.02)
TROPONIN T, HIGH SENSITIVITY RESULT: 23 NG/L — SIGNIFICANT CHANGE UP (ref 0–51)
UROBILINOGEN FLD QL: NEGATIVE — SIGNIFICANT CHANGE UP
VIT B12 SERPL-MCNC: 768 PG/ML — SIGNIFICANT CHANGE UP (ref 232–1245)
WBC # BLD: 7.2 K/UL — SIGNIFICANT CHANGE UP (ref 3.8–10.5)
WBC # FLD AUTO: 7.2 K/UL — SIGNIFICANT CHANGE UP (ref 3.8–10.5)

## 2020-10-17 PROCEDURE — 71045 X-RAY EXAM CHEST 1 VIEW: CPT | Mod: 26

## 2020-10-17 PROCEDURE — 99285 EMERGENCY DEPT VISIT HI MDM: CPT

## 2020-10-17 PROCEDURE — 93010 ELECTROCARDIOGRAM REPORT: CPT

## 2020-10-17 PROCEDURE — 93306 TTE W/DOPPLER COMPLETE: CPT | Mod: 26

## 2020-10-17 PROCEDURE — 70450 CT HEAD/BRAIN W/O DYE: CPT | Mod: 26

## 2020-10-17 PROCEDURE — 71275 CT ANGIOGRAPHY CHEST: CPT | Mod: 26

## 2020-10-17 RX ORDER — ACETAMINOPHEN 500 MG
650 TABLET ORAL EVERY 6 HOURS
Refills: 0 | Status: DISCONTINUED | OUTPATIENT
Start: 2020-10-17 | End: 2020-10-18

## 2020-10-17 RX ORDER — ANASTROZOLE 1 MG/1
1 TABLET ORAL DAILY
Refills: 0 | Status: DISCONTINUED | OUTPATIENT
Start: 2020-10-17 | End: 2020-10-19

## 2020-10-17 RX ORDER — ASPIRIN/CALCIUM CARB/MAGNESIUM 324 MG
81 TABLET ORAL DAILY
Refills: 0 | Status: DISCONTINUED | OUTPATIENT
Start: 2020-10-17 | End: 2020-10-19

## 2020-10-17 RX ORDER — HEPARIN SODIUM 5000 [USP'U]/ML
5000 INJECTION INTRAVENOUS; SUBCUTANEOUS EVERY 12 HOURS
Refills: 0 | Status: DISCONTINUED | OUTPATIENT
Start: 2020-10-17 | End: 2020-10-19

## 2020-10-17 RX ADMIN — Medication 650 MILLIGRAM(S): at 18:06

## 2020-10-17 RX ADMIN — Medication 650 MILLIGRAM(S): at 17:09

## 2020-10-17 RX ADMIN — HEPARIN SODIUM 5000 UNIT(S): 5000 INJECTION INTRAVENOUS; SUBCUTANEOUS at 17:10

## 2020-10-17 RX ADMIN — ANASTROZOLE 1 MILLIGRAM(S): 1 TABLET ORAL at 12:35

## 2020-10-17 NOTE — CONSULT NOTE ADULT - SUBJECTIVE AND OBJECTIVE BOX
CHIEF COMPLAINT:Patient is a 70y old  Female who presents with a chief complaint of loc (17 Oct 2020 07:45)      HPI:     70y Female complaining of LOC.   70 female, Hx: HTN ,  ca breast 2018 presents with multiple episodes of LOC  . Pt is accompanied by  who reports he head a thud this evening, and found his wife on the ground - patient was breathing but not responding and did not wake up spontaneously. Reports she eventually became responsive, and lost consciousness again. Pt denies any fevers, chills, nausea, vomiting, CP, SOB, abdominal pain, diarrhea, constipation, bloody stools, vaginal bleeding, denies any numbness/tingling/weakness in peripheral extremities. Denies any night sweats, weight loss, tremors.      PAST MEDICAL & SURGICAL HISTORY:  Osteoporosis    Hypertension    No significant past surgical history        MEDICATIONS  (STANDING):  heparin   Injectable 5000 Unit(s) SubCutaneous every 12 hours    MEDICATIONS  (PRN):      FAMILY HISTORY:      SOCIAL HISTORY:    [x ] Non-smoker  [ ] Smoker  [ ] Alcohol    Allergies    Cipro (Rash)  penicillin (Rash)    Intolerances    	    REVIEW OF SYSTEMS:  CONSTITUTIONAL: No fever, weight loss, or fatigue  EYES: No eye pain, visual disturbances, or discharge  ENT:  No difficulty hearing, tinnitus, vertigo; No sinus or throat pain  NECK: No pain or stiffness  RESPIRATORY: No cough, wheezing, chills or hemoptysis; No Shortness of Breath  CARDIOVASCULAR: No chest pain, palpitations, passing out, dizziness, or leg swelling  GASTROINTESTINAL: No abdominal or epigastric pain. No nausea, vomiting, or hematemesis; No diarrhea or constipation. No melena or hematochezia.  GENITOURINARY: No dysuria, frequency, hematuria, or incontinence  NEUROLOGICAL: No headaches, memory loss, loss of strength, numbness, or tremors, +loc  SKIN: No itching, burning, rashes, or lesions   LYMPH Nodes: No enlarged glands  ENDOCRINE: No heat or cold intolerance; No hair loss  MUSCULOSKELETAL: No joint pain or swelling; No muscle, back, or extremity pain  PSYCHIATRIC: No depression, anxiety, mood swings, or difficulty sleeping  HEME/LYMPH: No easy bruising, or bleeding gums  ALLERGY AND IMMUNOLOGIC: No hives or eczema	    [ ] All others negative	  [ ] Unable to obtain    PHYSICAL EXAM:  T(C): 36.9 (10-17-20 @ 05:03), Max: 36.9 (10-17-20 @ 05:03)  HR: 78 (10-17-20 @ 05:03) (78 - 94)  BP: 145/76 (10-17-20 @ 05:03) (111/82 - 157/95)  RR: 18 (10-17-20 @ 07:49) (16 - 18)  SpO2: 100% (10-17-20 @ 07:49) (98% - 100%)  Wt(kg): --  I&O's Summary    17 Oct 2020 07:01  -  17 Oct 2020 09:59  --------------------------------------------------------  IN: 0 mL / OUT: 900 mL / NET: -900 mL        Appearance: Normal	  HEENT:   Normal oral mucosa, PERRL, EOMI	  Lymphatic: No lymphadenopathy  Cardiovascular: Normal S1 S2, No JVD, + murmurs, No edema  Respiratory: Lungs clear to auscultation	  Psychiatry: A & O x 3, Mood & affect appropriate  Gastrointestinal:  Soft, Non-tender, + BS	  Skin: No rashes, No ecchymoses, No cyanosis	  Neurologic: Non-focal  Extremities: Normal range of motion, No clubbing, cyanosis or edema  Vascular: Peripheral pulses palpable 2+ bilaterally    TELEMETRY: 	    ECG:  	  RADIOLOGY:  OTHER: 	  	  LABS:	 	    CARDIAC MARKERS:                              13.4   7.20  )-----------( 207      ( 17 Oct 2020 01:31 )             41.1     10-17    138  |  98  |  14  ----------------------------<  114<H>  3.7   |  27  |  0.67    Ca    10.5      17 Oct 2020 01:31    TPro  7.6  /  Alb  4.3  /  TBili  0.2  /  DBili  x   /  AST  39  /  ALT  29  /  AlkPhos  68  10-17    proBNP: Serum Pro-Brain Natriuretic Peptide: 140 pg/mL (10-17 @ 01:31)    Lipid Profile:   HgA1c:   TSH:       PREVIOUS DIAGNOSTIC TESTING:      < from: CT Angio Chest w/ IV Cont (10.17.20 @ 02:45) >  No evidence of main, lobar, or proximal segmental pulmonary artery embolism. Limited evaluation the distal segmental and subsegmental pulmonary arteries.    Small focal subpleural groundglass opacity in the left upper lobe measuring 1 cm, nonspecific, potentially infectious or inflammatory, although a follow-up chest CT in 6 months should be performed to document resolution or stability.    Several scattered solid lung nodules measuring under 6 mm, nonspecific. In a low risk patient, no routine follow-up is needed. In a high-risk patient, consider follow-up chest CT in 12 months.      < from: Xray Chest 1 View- PORTABLE-Urgent (10.17.20 @ 02:51) >  No focal consolidation.        < from: CT Head No Cont (10.17.20 @ 01:47) >  No acute intracranial hemorrhage, large territorial infarct, or mass effect.

## 2020-10-17 NOTE — ED PROVIDER NOTE - PHYSICAL EXAMINATION
GEN - NAD; well appearing; A+Ox3   HEAD - NC/AT, No visible Ecchymosis, No Abrasions, No Lacerations/Skin Tears     EYES - EOMI, PERRL, no conjunctival pallor, no scleral icterus  ENT -   mucous membranes  moist , no discharge      NECK - Neck supple, No LAD, No Swelling  PULM - CTA B/L,  symmetric breath sounds  COR -  RRR, S1 S2, no murmurs  ABD - NT/ND, soft, no guarding, no rebound, no masses    BACK - no CVA tenderness, nontender midline CTL spine     EXTREMS - 2+ Pulses B/L UE and LE; 0+ edema, no gross deformity, warm and well perfused, no calf tenderness/swelling/erythema    SKIN - no rash or bruising      NEUROLOGIC - alert, CN3-12 intact, sensation nl, moving all 4 ext with 5/5 Strength

## 2020-10-17 NOTE — ED ADULT NURSE NOTE - OBJECTIVE STATEMENT
70y female BIBEMS for "syncopal" episode at home, per  patient was found on floor by , had 3 episodes of LOC, patient has no memory of event, in ED patient a/o x3, some episodes of emesis upon arrival, PMH HTN, breast CA w/ left sided lymph node removal, denies headache, chest pain, shortness of breath, abd soft and nontender, moves all extremities w/+ pulses, skin intact, 20g IV lock placed by EMS right ac, labs drawn, bed in lowest position, comfort and safety provided.

## 2020-10-17 NOTE — ED PROVIDER NOTE - CLINICAL SUMMARY MEDICAL DECISION MAKING FREE TEXT BOX
70 female, Hx: HTN - presents with multiple episodes of LOC this evening. Exam, presentation, and history concerning for LOC in the setting of cardiogenic (structural heart disease/valvular), arrhythmogenic, electrolyte, orthostatic, or neurogenic (consider seizure). Plan: CBC, CMP, EKG, CXR, Troponin, CT Head, UA, UCx. 70 female, Hx: HTN - presents with multiple episodes of LOC this evening. Exam, presentation, and history concerning for LOC in the setting of cardiogenic (structural heart disease/valvular), arrhythmogenic, electrolyte, orthostatic, or neurogenic (consider seizure - however less likely no urinary incont, no tongue bites), PE (low risk [place]). Plan: CBC, CMP, EKG, CXR, Troponin, CT Head, UA, UCx.

## 2020-10-17 NOTE — H&P ADULT - NSHPPHYSICALEXAM_GEN_ALL_CORE
PHYSICAL EXAMINATION:  Vital Signs Last 24 Hrs  T(C): 36.9 (17 Oct 2020 05:03), Max: 36.9 (17 Oct 2020 05:03)  T(F): 98.4 (17 Oct 2020 05:03), Max: 98.4 (17 Oct 2020 05:03)  HR: 78 (17 Oct 2020 05:03) (78 - 94)  BP: 145/76 (17 Oct 2020 05:03) (111/82 - 157/95)  BP(mean): --  RR: 18 (17 Oct 2020 05:03) (16 - 18)  SpO2: 98% (17 Oct 2020 05:03) (98% - 100%)  CAPILLARY BLOOD GLUCOSE            GENERAL: NAD, well-groomed,  HEAD:  atraumatic, normocephalic  EYES: sclera anicteric  ENMT: mucous membranes moist  NECK: supple, No JVD  CHEST/LUNG: clear to auscultation bilaterally;    no      rales   ,   no rhonchi,   HEART: normal S1, S2  ABDOMEN: BS+, soft, ND, NT   EXTREMITIES:    no    edema    b/l LEs  NEURO: awake, ,     moves all extremities  SKIN: no     rash

## 2020-10-17 NOTE — H&P ADULT - ASSESSMENT
70 female, Hx: HTN   - presents with multiple episodes    reported, that  he head a thud this evening, and found his wife on the ground - patient was breathing but not responding and did not wake up spontaneously. Reports she eventually became responsive, and lost consciousness again. Pt denies any fevers,  CP, SOB, abdominal pain,    syncope  normal  troponin/  ct  head,  normal  alcohol, negative   tele  monitoring  Cy  angio  chest,  no  PE/  mlple  small  nodules/ GOO, left upper lung   check  orthostatics/ echo  neuro  and  card  eval   PT  eval  on  dvt  ppx       ec< from: CT Angio Chest w/ IV Cont (10.17.20 @ 02:45) >  IMPRESSION:  No evidence of main, lobar, or proximal segmental pulmonary artery embolism. Limited evaluation the distal segmental and subsegmental pulmonary arteries.  Small focal subpleural groundglass opacity in the left upper lobe measuring 1 cm, nonspecific, potentially infectious or inflammatory, although a follow-up chest CT in 6 months should be performed to document resolution or stability.    Several scattered solid lung nodules measuring under 6 mm, nonspecific. In a low risk patient, no routine follow-up is needed. In a high-risk patient, consider follow-up chest CT in 12 months.  < end of copied text >       rd< from: CT Head No Cont (10.17.20 @ 01:47) >  MPRESSION:  No acute intracranial hemorrhage, large territorial infarct, or mass effect.    < end of copied text >   70 female,   Hx: HTN ,  Ca  breast  2018,  s/p  lumpectomy/ path, invasive  ductal  cancer  - presents with multiple   syncopal episodes    reported, that  he head a thud this evening, and found his wife on the ground - patient was breathing but not responding and did not wake up spontaneously. Reports she eventually became responsive, and lost consciousness again. Pt denies any fevers,  CP, SOB, abdominal pain,    syncope  normal  troponin/  ct  head,  normal  alcohol, negative   tele  monitoring  Cy  angio  chest,  no  PE/  mlple  small  nodules/ GOO, left upper lung   check  orthostatics/ echo  hold  cozaar/  hctz for  now  neuro  and  card  eval   PT  eval  on  dvt  ppx       ec< from: CT Angio Chest w/ IV Cont (10.17.20 @ 02:45) >  IMPRESSION:  No evidence of main, lobar, or proximal segmental pulmonary artery embolism. Limited evaluation the distal segmental and subsegmental pulmonary arteries.  Small focal subpleural groundglass opacity in the left upper lobe measuring 1 cm, nonspecific, potentially infectious or inflammatory, although a follow-up chest CT in 6 months should be performed to document resolution or stability.    Several scattered solid lung nodules measuring under 6 mm, nonspecific. In a low risk patient, no routine follow-up is needed. In a high-risk patient, consider follow-up chest CT in 12 months.  < end of copied text >       rd< from: CT Head No Cont (10.17.20 @ 01:47) >  MPRESSION:  No acute intracranial hemorrhage, large territorial infarct, or mass effect.    < end of copied text >

## 2020-10-17 NOTE — ED ADULT NURSE NOTE - NSIMPLEMENTINTERV_GEN_ALL_ED
Implemented All Fall Risk Interventions:  [place] to call system. Call bell, personal items and telephone within reach. Instruct patient to call for assistance. Room bathroom lighting operational. Non-slip footwear when patient is off stretcher. Physically safe environment: no spills, clutter or unnecessary equipment. Stretcher in lowest position, wheels locked, appropriate side rails in place. Provide visual cue, wrist band, yellow gown, etc. Monitor gait and stability. Monitor for mental status changes and reorient to person, place, and time. Review medications for side effects contributing to fall risk. Reinforce activity limits and safety measures with patient and family.

## 2020-10-17 NOTE — H&P ADULT - HISTORY OF PRESENT ILLNESS
70y Female complaining of LOC.   70 female, Hx: HTN   - presents with multiple episodes of LOC  . Pt is accompanied by  who reports he head a thud this evening, and found his wife on the ground - patient was breathing but not responding and did not wake up spontaneously. Reports she eventually became responsive, and lost consciousness again. Pt denies any fevers, chills, nausea, vomiting, CP, SOB, abdominal pain, diarrhea, constipation, bloody stools, vaginal bleeding, denies any numbness/tingling/weakness in peripheral extremities. Denies any night sweats, weight loss, tremors.      70y Female complaining of LOC.   70 female, Hx: HTN ,  ca breast 2018  - presents with multiple episodes of LOC  . Pt is accompanied by  who reports he head a thud this evening, and found his wife on the ground - patient was breathing but not responding and did not wake up spontaneously. Reports she eventually became responsive, and lost consciousness again. Pt denies any fevers, chills, nausea, vomiting, CP, SOB, abdominal pain, diarrhea, constipation, bloody stools, vaginal bleeding, denies any numbness/tingling/weakness in peripheral extremities. Denies any night sweats, weight loss, tremors.

## 2020-10-17 NOTE — ED PROVIDER NOTE - ATTENDING CONTRIBUTION TO CARE
69 yo female p/w syncope @ home today;  found her on the floor next to the bed, she roused briefly then became unresponsive again.  now endorses nausea, no CP/SOB.  has been sleep deprived lately and stressed with medical issues related to son.  nontoxic on my assessment.  will CT head, check labs, likely admit for tele and further management/syncope eval.

## 2020-10-17 NOTE — CONSULT NOTE ADULT - ASSESSMENT
70y Female complaining of LOC.   70 female, Hx: HTN ,  ca breast 2018 presents with multiple episodes of LOC  . Pt is accompanied by  who reports he head a thud this evening, and found his wife on the ground - patient was breathing but not responding and did not wake up spontaneously. Reports she eventually became responsive, and lost consciousness again. Pt denies any fevers, chills, nausea, vomiting, CP, SOB, abdominal pain, diarrhea, constipation, bloody stools, vaginal bleeding, denies any numbness/tingling/weakness in peripheral extremities. Denies any night sweats, weight loss, tremors.  multiple syncopal episodes  check orthostatic  tele  echo  neuro eval/ mri brain  EEG  asa daily  lipid panel  tsh/b12

## 2020-10-17 NOTE — ED PROVIDER NOTE - OBJECTIVE STATEMENT
70 female, Hx: HTN - presents with multiple episodes of LOC this evening. Pt is accompanied by  who reports he head a thud this evening, and found his wife on the ground - patient was breathing but not responding and did not wake up spontaneously. Reports she eventually became responsive, and lost consciousness again. Pt denies any fevers, chills, nausea, vomiting, CP, SOB, abdominal pain, diarrhea, constipation, bloody stools, vaginal bleeding, denies any numbness/tingling/weakness in peripheral extremities. Denies any night sweats, weight loss, tremors.

## 2020-10-17 NOTE — ED PROVIDER NOTE - NS ED ROS FT
Constitution: No Fever or chills, No Weight Loss,   Eyes: No visual changes  HEENT: No cough, No Discharge, No Rhinorrhea, No URI symptoms  Cardio: No Chest pain, No Palpitations, No Dyspnea  Resp: No SOB, No Wheezing  GI: No abdominal pain, No Nausea, No Vomiting, No Constipation, No Diarrhea  : No burning upon urination, trouble urinating, no foul odor from urine  MSK: No Back pain, No Numbness, No Tingling, No Weakness  Neuro: (+) LOC; No Headache, No changes to Vision, No changes to Hearing, Normal Gait  Skin: No rashes, No Bruising, No Swelling

## 2020-10-17 NOTE — H&P ADULT - NSHPLABSRESULTS_GEN_ALL_CORE
LABS:                        13.4   7.20  )-----------( 207      ( 17 Oct 2020 01:31 )             41.1     10-17    138  |  98  |  14  ----------------------------<  114<H>  3.7   |  27  |  0.67    Ca    10.5      17 Oct 2020 01:31    TPro  7.6  /  Alb  4.3  /  TBili  0.2  /  DBili  x   /  AST  39  /  ALT  29  /  AlkPhos  68  10-17

## 2020-10-18 ENCOUNTER — TRANSCRIPTION ENCOUNTER (OUTPATIENT)
Age: 70
End: 2020-10-18

## 2020-10-18 LAB
ANION GAP SERPL CALC-SCNC: 10 MMOL/L — SIGNIFICANT CHANGE UP (ref 5–17)
BUN SERPL-MCNC: 8 MG/DL — SIGNIFICANT CHANGE UP (ref 7–23)
CALCIUM SERPL-MCNC: 9.5 MG/DL — SIGNIFICANT CHANGE UP (ref 8.4–10.5)
CHLORIDE SERPL-SCNC: 98 MMOL/L — SIGNIFICANT CHANGE UP (ref 96–108)
CHOLEST SERPL-MCNC: 207 MG/DL — HIGH (ref 10–199)
CO2 SERPL-SCNC: 28 MMOL/L — SIGNIFICANT CHANGE UP (ref 22–31)
CREAT SERPL-MCNC: 0.54 MG/DL — SIGNIFICANT CHANGE UP (ref 0.5–1.3)
GLUCOSE SERPL-MCNC: 90 MG/DL — SIGNIFICANT CHANGE UP (ref 70–99)
HCT VFR BLD CALC: 37.4 % — SIGNIFICANT CHANGE UP (ref 34.5–45)
HCV AB S/CO SERPL IA: 0.1 S/CO — SIGNIFICANT CHANGE UP (ref 0–0.99)
HCV AB SERPL-IMP: SIGNIFICANT CHANGE UP
HDLC SERPL-MCNC: 91 MG/DL — SIGNIFICANT CHANGE UP
HGB BLD-MCNC: 12.4 G/DL — SIGNIFICANT CHANGE UP (ref 11.5–15.5)
LIPID PNL WITH DIRECT LDL SERPL: 98 MG/DL — SIGNIFICANT CHANGE UP
MCHC RBC-ENTMCNC: 28.1 PG — SIGNIFICANT CHANGE UP (ref 27–34)
MCHC RBC-ENTMCNC: 33.2 GM/DL — SIGNIFICANT CHANGE UP (ref 32–36)
MCV RBC AUTO: 84.8 FL — SIGNIFICANT CHANGE UP (ref 80–100)
NRBC # BLD: 0 /100 WBCS — SIGNIFICANT CHANGE UP (ref 0–0)
PLATELET # BLD AUTO: 185 K/UL — SIGNIFICANT CHANGE UP (ref 150–400)
POTASSIUM SERPL-MCNC: 3.5 MMOL/L — SIGNIFICANT CHANGE UP (ref 3.5–5.3)
POTASSIUM SERPL-SCNC: 3.5 MMOL/L — SIGNIFICANT CHANGE UP (ref 3.5–5.3)
RBC # BLD: 4.41 M/UL — SIGNIFICANT CHANGE UP (ref 3.8–5.2)
RBC # FLD: 13.4 % — SIGNIFICANT CHANGE UP (ref 10.3–14.5)
SODIUM SERPL-SCNC: 136 MMOL/L — SIGNIFICANT CHANGE UP (ref 135–145)
TOTAL CHOLESTEROL/HDL RATIO MEASUREMENT: 2.3 RATIO — LOW (ref 3.3–7.1)
TRIGL SERPL-MCNC: 93 MG/DL — SIGNIFICANT CHANGE UP (ref 10–149)
TSH SERPL-MCNC: 1.16 UIU/ML — SIGNIFICANT CHANGE UP (ref 0.27–4.2)
WBC # BLD: 4.71 K/UL — SIGNIFICANT CHANGE UP (ref 3.8–10.5)
WBC # FLD AUTO: 4.71 K/UL — SIGNIFICANT CHANGE UP (ref 3.8–10.5)

## 2020-10-18 RX ORDER — LOSARTAN POTASSIUM 100 MG/1
25 TABLET, FILM COATED ORAL DAILY
Refills: 0 | Status: DISCONTINUED | OUTPATIENT
Start: 2020-10-18 | End: 2020-10-19

## 2020-10-18 RX ORDER — SODIUM CHLORIDE 9 MG/ML
1000 INJECTION INTRAMUSCULAR; INTRAVENOUS; SUBCUTANEOUS
Refills: 0 | Status: DISCONTINUED | OUTPATIENT
Start: 2020-10-18 | End: 2020-10-19

## 2020-10-18 RX ADMIN — SODIUM CHLORIDE 80 MILLILITER(S): 9 INJECTION INTRAMUSCULAR; INTRAVENOUS; SUBCUTANEOUS at 07:22

## 2020-10-18 RX ADMIN — ANASTROZOLE 1 MILLIGRAM(S): 1 TABLET ORAL at 11:22

## 2020-10-18 RX ADMIN — LOSARTAN POTASSIUM 25 MILLIGRAM(S): 100 TABLET, FILM COATED ORAL at 06:56

## 2020-10-18 NOTE — DISCHARGE NOTE PROVIDER - NSDCCPCAREPLAN_GEN_ALL_CORE_FT
PRINCIPAL DISCHARGE DIAGNOSIS  Diagnosis: Syncope and collapse  Assessment and Plan of Treatment: Normal troponins, CT Head normal, CTA negative for PE, LE dopplers negative for DVT, Echo normal EF.      SECONDARY DISCHARGE DIAGNOSES  Diagnosis: Orthostatic hypotension  Assessment and Plan of Treatment: Now resolved with IV fluids.    Diagnosis: Pulmonary nodules  Assessment and Plan of Treatment: CTA  chest findings:  No evidence of main, lobar, or proximal segmental pulmonary artery embolism. Limited evaluation the distal segmental and subsegmental pulmonary arteries.  Small focal subpleural groundglass opacity in the left upper lobe measuring 1 cm, nonspecific, potentially infectious or inflammatory.  Several scattered solid lung nodules measuring under 6 mm, nonspecific.   You will need a follow-up chest CT in 2 months.       PRINCIPAL DISCHARGE DIAGNOSIS  Diagnosis: Syncope and collapse  Assessment and Plan of Treatment: Normal troponins, CT Head normal, CTA negative for PE, lower extremity dopplers negative for DVT, Echo normal ejection fraction.  Carotid dopplers with moderate stenosis 50-69% of left internal carotid artery. Follow up with Dr Vale as outpatient.      SECONDARY DISCHARGE DIAGNOSES  Diagnosis: Abnormal Doppler ultrasound of carotid artery  Assessment and Plan of Treatment: Carotid dopplers with moderate stenosis of the left internal carotid artery. Per vascular surgery,, no surgical intervention required at this time.   Continue strict blood pressure control, Aspirin, Lipitor.   Follow up with Dr Vale as outpatient for routine surveillance and repeat imaging.    Diagnosis: Orthostatic hypotension  Assessment and Plan of Treatment: Now resolved with IV fluids.    Diagnosis: Pulmonary nodules  Assessment and Plan of Treatment: CTA  chest findings:  No evidence of main, lobar, or proximal segmental pulmonary artery embolism. Limited evaluation the distal segmental and subsegmental pulmonary arteries.  Small focal subpleural groundglass opacity in the left upper lobe measuring 1 cm, nonspecific, potentially infectious or inflammatory.  Several scattered solid lung nodules measuring under 6 mm, nonspecific.   You will need a follow-up CT Chest in 2 months.

## 2020-10-18 NOTE — DISCHARGE NOTE PROVIDER - CARE PROVIDER_API CALL
King Gaston)  Internal Medicine  [address] [place] [id number]  Phone: [phone]  Fax: [phone]  Established Patient  Follow Up Time: 1 week   King Gaston)  Internal Medicine  [address] [id number]  Phone: [phone]  Fax: [phone]  Established Patient  Follow Up Time: 1 week    Anahy Vale)  Surgery  [address] [place] [address], [address] [place] [address]  Phone: [phone]  Fax: [phone]  Established Patient  Follow Up Time: 1 week

## 2020-10-18 NOTE — PROGRESS NOTE ADULT - ASSESSMENT
70 female,   Hx: HTN ,  Ca  breast  2018,  s/p  lumpectomy/ path, invasive  ductal  cancer  - presents with multiple   syncopal episodes    reported, that  he head a thud this evening, and found his wife on the ground - patient was breathing but not responding and did not wake up spontaneously. Reports she eventually became responsive, and lost consciousness again. Pt denies any fevers,  CP, SOB, abdominal pain,    syncope  normal  troponin/  ct  head,  normal  alcohol, negative   tele  monitoring,  NSR  Cy  angio  chest,  no  PE/  mlple  small  nodules/ GOO, left upper lung  hold  cozaar/  hctz for  now  neuro  and  card  eval  HTN,  on  cozaar  echo,  pending  positive   orthostatics. on iv  fluids  pt  does  not  want  mri here, as  it is  not  an  open  mri   PT  eval  on  dvt  ppx       ec< from: CT Angio Chest w/ IV Cont (10.17.20 @ 02:45) >  IMPRESSION:  No evidence of main, lobar, or proximal segmental pulmonary artery embolism. Limited evaluation the distal segmental and subsegmental pulmonary arteries.  Small focal subpleural groundglass opacity in the left upper lobe measuring 1 cm, nonspecific, potentially infectious or inflammatory, although a follow-up chest CT in 6 months should be performed to document resolution or stability.    Several scattered solid lung nodules measuring under 6 mm, nonspecific. In a low risk patient, no routine follow-up is needed. In a high-risk patient, consider follow-up chest CT in 12 months.  < end of copied text >       rd< from: CT Head No Cont (10.17.20 @ 01:47) >  MPRESSION:  No acute intracranial hemorrhage, large territorial infarct, or mass effect.    < end of copied text >   70 female,   Hx: HTN ,  Ca  breast  2018,  s/p  lumpectomy/ path, invasive  ductal  cancer  - presents with multiple   syncopal episodes    reported, that  he head a thud this evening, and found his wife on the ground - patient was breathing but not responding and did not wake up spontaneously. Reports she eventually became responsive, and lost consciousness again. Pt denies any fevers,  CP, SOB, abdominal pain,    syncope  normal  troponin/  ct  head,  normal  alcohol, negative   tele  monitoring,  NSR  Cy  angio  chest,  no  PE/  mlple  small  nodules/ GOO, left upper lung  hold   hctz for  now  neuro  and  card  eval  HTN,  on  cozaar  echo,    normal  Ef  positive   orthostatics. on iv  fluids  pt  does  not  want  mri here, as  it is  not  an  open  mri    abl e to ambulate   on  dvt  ppx/  needs  f/p  ct chest  in  2   ,omths       ec< from: CT Angio Chest w/ IV Cont (10.17.20 @ 02:45) >  IMPRESSION:  No evidence of main, lobar, or proximal segmental pulmonary artery embolism. Limited evaluation the distal segmental and subsegmental pulmonary arteries.  Small focal subpleural groundglass opacity in the left upper lobe measuring 1 cm, nonspecific, potentially infectious or inflammatory, although a follow-up chest CT in 6 months should be performed to document resolution or stability.    Several scattered solid lung nodules measuring under 6 mm, nonspecific. In a low risk patient, no routine follow-up is needed. In a high-risk patient, consider follow-up chest CT in 12 months.  < end of copied text >       rd< from: CT Head No Cont (10.17.20 @ 01:47) >  MPRESSION:  No acute intracranial hemorrhage, large territorial infarct, or mass effect.    < end of copied text >

## 2020-10-18 NOTE — CONSULT NOTE ADULT - ASSESSMENT
70 female, Hx: HTN ,  ca breast 2018 here with episode of LOC, PE non-focal +orthostatic. CTH tte - Likely syncope    Patient scheduled for MRI can be done here or as outpt  Consider Carotid dopplers can be done here or as outpt  Can Follow up with me at [phone] for further w/u

## 2020-10-18 NOTE — PROGRESS NOTE ADULT - SUBJECTIVE AND OBJECTIVE BOX
CARDIOLOGY     PROGRESS  NOTE   ________________________________________________    CHIEF COMPLAINT:Patient is a 70y old  Female who presents with a chief complaint of loc (18 Oct 2020 10:05)  no complain.  	  REVIEW OF SYSTEMS:  CONSTITUTIONAL: No fever, weight loss, or fatigue  EYES: No eye pain, visual disturbances, or discharge  ENT:  No difficulty hearing, tinnitus, vertigo; No sinus or throat pain  NECK: No pain or stiffness  RESPIRATORY: No cough, wheezing, chills or hemoptysis; No Shortness of Breath  CARDIOVASCULAR: No chest pain, palpitations, passing out, dizziness, or leg swelling  GASTROINTESTINAL: No abdominal or epigastric pain. No nausea, vomiting, or hematemesis; No diarrhea or constipation. No melena or hematochezia.  GENITOURINARY: No dysuria, frequency, hematuria, or incontinence  NEUROLOGICAL: No headaches, memory loss, loss of strength, numbness, or tremors  SKIN: No itching, burning, rashes, or lesions   LYMPH Nodes: No enlarged glands  ENDOCRINE: No heat or cold intolerance; No hair loss  MUSCULOSKELETAL: No joint pain or swelling; No muscle, back, or extremity pain  PSYCHIATRIC: No depression, anxiety, mood swings, or difficulty sleeping  HEME/LYMPH: No easy bruising, or bleeding gums  ALLERGY AND IMMUNOLOGIC: No hives or eczema	    [ ] All others negative	  [ ] Unable to obtain    PHYSICAL EXAM:  T(C): 36.9 (10-18-20 @ 04:31), Max: 37.1 (10-17-20 @ 20:22)  HR: 72 (10-18-20 @ 04:31) (69 - 72)  BP: 153/84 (10-18-20 @ 04:31) (134/68 - 161/79)  RR: 18 (10-18-20 @ 04:31) (18 - 18)  SpO2: 98% (10-18-20 @ 04:31) (97% - 98%)  Wt(kg): --  I&O's Summary    17 Oct 2020 07:01  -  18 Oct 2020 07:00  --------------------------------------------------------  IN: 1180 mL / OUT: 1201 mL / NET: -21 mL    18 Oct 2020 07:01  -  18 Oct 2020 10:19  --------------------------------------------------------  IN: 280 mL / OUT: 0 mL / NET: 280 mL        Appearance: Normal	  HEENT:   Normal oral mucosa, PERRL, EOMI	  Lymphatic: No lymphadenopathy  Cardiovascular: Normal S1 S2, No JVD, + murmurs, No edema  Respiratory: Lungs clear to auscultation	  Psychiatry: A & O x 3, Mood & affect appropriate  Gastrointestinal:  Soft, Non-tender, + BS	  Skin: No rashes, No ecchymoses, No cyanosis	  Neurologic: Non-focal  Extremities: Normal range of motion, No clubbing, cyanosis or edema  Vascular: Peripheral pulses palpable 2+ bilaterally    MEDICATIONS  (STANDING):  anastrozole 1 milliGRAM(s) Oral daily  aspirin enteric coated 81 milliGRAM(s) Oral daily  heparin   Injectable 5000 Unit(s) SubCutaneous every 12 hours  LORazepam     Tablet 1 milliGRAM(s) Oral once  losartan 25 milliGRAM(s) Oral daily  sodium chloride 0.9%. 1000 milliLiter(s) (80 mL/Hr) IV Continuous <Continuous>      TELEMETRY: 	    ECG:  	  RADIOLOGY:  OTHER: 	  	  LABS:	 	    CARDIAC MARKERS:                                12.4   4.71  )-----------( 185      ( 18 Oct 2020 07:18 )             37.4     10-18    136  |  98  |  8   ----------------------------<  90  3.5   |  28  |  0.54    Ca    9.5      18 Oct 2020 07:18    TPro  7.6  /  Alb  4.3  /  TBili  0.2  /  DBili  x   /  AST  39  /  ALT  29  /  AlkPhos  68  10-17    proBNP: Serum Pro-Brain Natriuretic Peptide: 140 pg/mL (10-17 @ 01:31)    Lipid Profile:   HgA1c:   TSH: Thyroid Stimulating Hormone, Serum: 1.16 uIU/mL (10-18 @ 09:27)      < from: TTE with Doppler (w/3D Echo) (Transthoracic Echocardiogram) (10.17.20 @ 14:46) >  1. Normal left ventricular internal dimensions and wall  thicknesses.  2. Normal left ventricular systolic function. No segmental  wall motion abnormalities.  3. Normal right ventricular size and function.    < end of copied text >      Assessment and plan  ---------------------------   70y Female complaining of LOC.   70 female, Hx: HTN ,  ca breast 2018 presents with multiple episodes of LOC  . Pt is accompanied by  who reports he head a thud this evening, and found his wife on the ground - patient was breathing but not responding and did not wake up spontaneously. Reports she eventually became responsive, and lost consciousness again. Pt denies any fevers, chills, nausea, vomiting, CP, SOB, abdominal pain, diarrhea, constipation, bloody stools, vaginal bleeding, denies any numbness/tingling/weakness in peripheral extremities. Denies any night sweats, weight loss, tremors.  multiple syncopal episodes  check orthostatic/ +severe orthostatic ?sec to hctz  ivf/rodrigo stockings  tele no arrythmia  echo noted  neuro eval/ mri brain  asa daily  lipid panel  dvt prophylaxis  tsh/b12

## 2020-10-18 NOTE — DISCHARGE NOTE PROVIDER - NSDCMRMEDTOKEN_GEN_ALL_CORE_FT
losartan-hydroCHLOROthiazide 100 mg-25 mg oral tablet: 1 tab(s) orally once a day  supplements: MVI, calcium, lutein:    anastrozole 1 mg oral tablet: 1 tab(s) orally once a day  aspirin 81 mg oral delayed release tablet: 1 tab(s) orally once a day  losartan 50 mg oral tablet: 1 tab(s) orally once a day  losartan-hydroCHLOROthiazide 100 mg-25 mg oral tablet: 1 tab(s) orally once a day  supplements: MVI, calcium, lutein:    anastrozole 1 mg oral tablet: 1 tab(s) orally once a day  atorvastatin 10 mg oral tablet: 1 tab(s) orally once a day (at bedtime)  losartan 50 mg oral tablet: 1 tab(s) orally once a day  losartan-hydroCHLOROthiazide 100 mg-25 mg oral tablet: 1 tab(s) orally once a day  supplements: MVI, calcium, lutein:    anastrozole 1 mg oral tablet: 1 tab(s) orally once a day  aspirin 81 mg oral tablet, chewable: 1 tab(s) orally once a day  atorvastatin 10 mg oral tablet: 1 tab(s) orally once a day (at bedtime)  losartan 50 mg oral tablet: 1 tab(s) orally once a day  losartan-hydroCHLOROthiazide 100 mg-25 mg oral tablet: 1 tab(s) orally once a day  supplements: MVI, calcium, lutein:    anastrozole 1 mg oral tablet: 1 tab(s) orally once a day  aspirin 81 mg oral tablet, chewable: 1 tab(s) orally once a day  atorvastatin 10 mg oral tablet: 1 tab(s) orally once a day (at bedtime)  losartan 50 mg oral tablet: 1 tab(s) orally once a day  supplements: MVI, calcium, lutein:

## 2020-10-18 NOTE — DISCHARGE NOTE PROVIDER - NSDCFUADDAPPT_GEN_ALL_CORE_FT
fp  Ct  chest , in 2 months/  pulm nodules You will need a follow-up chest CT in 2 months. Follow-up with Dr Gaston (PCP) for a repeat CT Chest in 2 months.    Follow up with Dr Vale (vascular surgery) for routine surveillance of left internal carotid artery (found to have moderate stenosis).

## 2020-10-18 NOTE — DISCHARGE NOTE PROVIDER - HOSPITAL COURSE
70 female, Hx: HTN, ca breast 2018 presents with multiple episodes of LOC. Pt is accompanied by  who reports he heard a thud this evening, and found his wife on the ground - patient was breathing but not responding and did not wake up spontaneously. Reports she eventually became responsive, and lost consciousness again. Pt denies any fevers, chills, nausea, vomiting, CP, SOB, abdominal pain, diarrhea, constipation, bloody stools, vaginal bleeding, denies any numbness/tingling/weakness in peripheral extremities. Denies any night sweats, weight loss, tremors. Normal troponins, CT Head normal, CTA negative for PE, LE dopplers negative for DVT, Echo normal EF. Pt does not want an MRI performed here as it is an open MRI. Found to have positive orthostatics and given IVF. CTA chest with  no  PE/  multiple small  nodules/ GOO, left upper lung - need repeat CT chest in 2 months. Stable for discharge home and to continue with new medication regimen. 70 female, Hx: HTN, ca breast 2018 presents with multiple episodes of LOC. Pt is accompanied by  who reports he heard a thud this evening, and found his wife on the ground - patient was breathing but not responding and did not wake up spontaneously. Reports she eventually became responsive, and lost consciousness again. Pt denies any fevers, chills, nausea, vomiting, CP, SOB, abdominal pain, diarrhea, constipation, bloody stools, vaginal bleeding, denies any numbness/tingling/weakness in peripheral extremities. Denies any night sweats, weight loss, tremors. Normal troponins, CT Head normal, CTA negative for PE, LE dopplers negative for DVT, Echo normal EF. Pt does not want an MRI performed here as it is an open MRI. Found to have positive orthostatics and given IVF. CTA chest with  no  PE/  multiple small  nodules/ GOO, left upper lung - need repeat CT chest in 2 months. Carotid dopplers with moderate stenosis of the left ICA. Per vascular sx, no surgical intervention required at this time, continue strict BP control and Aspirin, start Aspirin, f/u with Dr Anahy Vale as outpatient. Stable for discharge home and to continue with new medication regimen. 70 year old female PMH HTN, Cancer of breast 2018 presents with multiple episodes of LOC. Pt is accompanied by  who reports he heard a thud this evening, and found his wife on the ground. Patient was breathing but not responding and did not wake up spontaneously. Reports she eventually became responsive, and lost consciousness again. Pt denies any fevers, chills, nausea, vomiting, CP, SOB, abdominal pain, diarrhea, constipation, bloody stools, vaginal bleeding, denies any numbness/tingling/weakness in peripheral extremities. Denies any night sweats, weight loss, tremors.     Upon arrival, normal troponins, CT Head normal, CTA negative for PE, small nodules were noted. LE dopplers negative for DVT, Echo normal EF with normal LV, RV function. Pt does not want an MRI performed here as it is a closed MRI. Found to have positive orthostatics and given IVF. HCTZ was stopped. CTA chest with  no  PE/  multiple small  nodules in left upper lung, need repeat CT chest. Carotid dopplers with moderate stenosis of the left ICA, 69 %. Per vascular sx, no surgical intervention required at this time, continue strict BP control and Aspirin and, statin. Stable for discharge home and to continue with new medication regimen. See attached med list. 70 year old female PMH HTN, Cancer of breast 2018 presents with multiple episodes of LOC. Pt is accompanied by  who reports he heard a thud this evening, and found his wife on the ground. Patient was breathing but not responding and did not wake up spontaneously. Reports she eventually became responsive, and lost consciousness again. Pt denies any fevers, chills, nausea, vomiting, CP, SOB, abdominal pain, diarrhea, constipation, bloody stools, vaginal bleeding, denies any numbness/tingling/weakness in peripheral extremities. Denies any night sweats, weight loss, tremors.     Upon arrival, normal troponins, CBC and SMA-7 all normal. CXR clear, COVID-19 negative, EKG is NSR. CT Head normal, CTA negative for PE, small nodules were noted. LE dopplers negative for DVT, Echo normal EF with normal LV, RV function. Pt does not want an MRI performed here as it is a closed MRI.     Found to have positive orthostatics and given IVF. HCTZ was stopped. CTA chest with  no  PE/  multiple small  nodules in left upper lung, need repeat CT chest in few months. Carotid dopplers with moderate stenosis of the left ICA, 69 %. Per vascular sx, no surgical intervention required at this time, continue strict BP control and Aspirin and statin. Stable for discharge home and to continue with new medication regimen. See attached med list.

## 2020-10-18 NOTE — CONSULT NOTE ADULT - SUBJECTIVE AND OBJECTIVE BOX
Neurology consult    SHAYLA RODRIGUEZ70yFemale     Patient is a 70y old  Female who presents with a chief complaint of loc (18 Oct 2020 09:10)      HPI:     "70y Female complaining of LOC.   70 female, Hx: HTN ,  ca breast 2018  - presents with multiple episodes of LOC  . Pt is accompanied by  who reports he head a thud this evening, and found his wife on the ground - patient was breathing but not responding and did not wake up spontaneously. Reports she eventually became responsive, and lost consciousness again. Pt denies any fevers, chills, nausea, vomiting, CP, SOB, abdominal pain, diarrhea, constipation, bloody stools, vaginal bleeding, denies any numbness/tingling/weakness in peripheral extremities. Denies any night sweats, weight loss, tremors.   (17 Oct 2020 07:45)"  Possible lightheadedness prior, orthostatic in hospital    no difficulty with language.  No vision loss or double vision.  No dizziness, vertigo or new hearing loss.  . No difficulty with swallowing.  No focal weakness.  No focal sensory changes.  No numbness or tingling in the bilateral lower extremities.  No difficulty with balance.  No difficulty with ambulation.    REVIEW OF SYSTEMS:    Constitutional: No fever, chills, fatigue, weakness  see HPi  MEDICATIONS    anastrozole 1 milliGRAM(s) Oral daily  aspirin enteric coated 81 milliGRAM(s) Oral daily  heparin   Injectable 5000 Unit(s) SubCutaneous every 12 hours  LORazepam     Tablet 1 milliGRAM(s) Oral once  losartan 25 milliGRAM(s) Oral daily  sodium chloride 0.9%. 1000 milliLiter(s) IV Continuous <Continuous>      PMH: Osteoporosis    Hypertension         PSH: No significant past surgical history        Family history: No history of dementia, strokes, or seizures   FAMILY HISTORY:      SOCIAL HISTORY:  No history of tobacco or alcohol use     Allergies    Cipro (Rash)  penicillin (Rash)    Intolerances            Vital Signs Last 24 Hrs  T(C): 36.9 (18 Oct 2020 04:31), Max: 37.1 (17 Oct 2020 20:22)  T(F): 98.4 (18 Oct 2020 04:31), Max: 98.8 (17 Oct 2020 20:22)  HR: 72 (18 Oct 2020 04:31) (69 - 72)  BP: 153/84 (18 Oct 2020 04:31) (134/68 - 161/79)  BP(mean): --  RR: 18 (18 Oct 2020 04:31) (18 - 18)  SpO2: 98% (18 Oct 2020 04:31) (97% - 98%)      On Neurological Examination:    Mental Status - Patient is alert, awake, oriented X3. fluent, names, no dysarthria no aphasia Follows commands well and able to answer questions appropriately. Mood and affect  normal    Cranial Nerves - PERRL, EOMI, VFF, V1-V3 intact, no gross facial asymmetry, tongue/uvula midline    Motor Exam -   5/5 throughout     nml bulk/tone    Sensory    Intact to light touch and pinprick bilaterally    Coord: FTN intact bilaterally     Gait -  normal      Reflexes:          2+ throughout                                               GENERAL Exam:     Nontoxic , No Acute Distress   	  HEENT:  normocephalic, atraumatic  		  LUNGS:	Clear bilaterally  No Wheeze    	  HEART:	 Normal S1S2   No murmur RRR        	  GI/ ABDOMEN:  Soft  Non tender    EXTREMITIES:   No Edema  No Clubbing  No Cyanosis No Edema    MUSCULOSKELETAL: Normal Range of Motion  	   SKIN:      Normal   No Ecchymosis               LABS:  CBC Full  -  ( 18 Oct 2020 07:18 )  WBC Count : 4.71 K/uL  RBC Count : 4.41 M/uL  Hemoglobin : 12.4 g/dL  Hematocrit : 37.4 %  Platelet Count - Automated : 185 K/uL  Mean Cell Volume : 84.8 fl  Mean Cell Hemoglobin : 28.1 pg  Mean Cell Hemoglobin Concentration : 33.2 gm/dL  Auto Neutrophil # : x  Auto Lymphocyte # : x  Auto Monocyte # : x  Auto Eosinophil # : x  Auto Basophil # : x  Auto Neutrophil % : x  Auto Lymphocyte % : x  Auto Monocyte % : x  Auto Eosinophil % : x  Auto Basophil % : x    Urinalysis Basic - ( 17 Oct 2020 08:24 )    Color: Light Yellow / Appearance: Clear / S.029 / pH: x  Gluc: x / Ketone: Negative  / Bili: Negative / Urobili: Negative   Blood: x / Protein: Negative / Nitrite: Negative   Leuk Esterase: Negative / RBC: x / WBC x   Sq Epi: x / Non Sq Epi: x / Bacteria: x      10-18    136  |  98  |  8   ----------------------------<  90  3.5   |  28  |  0.54    Ca    9.5      18 Oct 2020 07:18    TPro  7.6  /  Alb  4.3  /  TBili  0.2  /  DBili  x   /  AST  39  /  ALT  29  /  AlkPhos  68  10-17    LIVER FUNCTIONS - ( 17 Oct 2020 01:31 )  Alb: 4.3 g/dL / Pro: 7.6 g/dL / ALK PHOS: 68 U/L / ALT: 29 U/L / AST: 39 U/L / GGT: x           Hemoglobin A1C:     Vitamin B12, Serum: 768 pg/mL (10-17 @ 13:02)          RADIOLOGY  < from: CT Head No Cont (10.17.20 @ 01:47) >  IMPRESSION:  No acute intracranial hemorrhage, large territorial infarct, or mass effect.      < end of copied text >

## 2020-10-18 NOTE — PROGRESS NOTE ADULT - SUBJECTIVE AND OBJECTIVE BOX
no  cp/sob  REVIEW OF SYSTEMS:  GEN: no fever,    no chills  RESP: no SOB,   no cough  CVS: no chest pain,   no palpitations  GI: no abdominal pain,   no nausea,   no vomiting,   no constipation,   no diarrhea  : no dysuria,   no frequency  NEURO: no headache,   no dizziness  PSYCH: no depression,   not anxious  Derm : no rash    MEDICATIONS  (STANDING):  anastrozole 1 milliGRAM(s) Oral daily  aspirin enteric coated 81 milliGRAM(s) Oral daily  heparin   Injectable 5000 Unit(s) SubCutaneous every 12 hours  LORazepam     Tablet 1 milliGRAM(s) Oral once    MEDICATIONS  (PRN):  acetaminophen   Tablet .. 650 milliGRAM(s) Oral every 6 hours PRN Mild Pain (1 - 3)      Vital Signs Last 24 Hrs  T(C): 36.9 (18 Oct 2020 04:31), Max: 37.1 (17 Oct 2020 20:22)  T(F): 98.4 (18 Oct 2020 04:31), Max: 98.8 (17 Oct 2020 20:22)  HR: 72 (18 Oct 2020 04:31) (69 - 72)  BP: 153/84 (18 Oct 2020 04:31) (134/68 - 161/79)  BP(mean): --  RR: 18 (18 Oct 2020 04:31) (18 - 18)  SpO2: 98% (18 Oct 2020 04:31) (97% - 100%)  CAPILLARY BLOOD GLUCOSE        I&O's Summary    17 Oct 2020 07:01  -  18 Oct 2020 06:43  --------------------------------------------------------  IN: 1180 mL / OUT: 1201 mL / NET: -21 mL        PHYSICAL EXAM:  HEAD:  Atraumatic, Normocephalic  NECK: Supple, No   JVD  CHEST/LUNG:   no     rales,     no,    rhonchi  HEART: Regular rate and rhythm;         murmur  ABDOMEN: Soft, Nontender, ;   EXTREMITIES:   no     edema  NEUROLOGY:  alert    LABS:                        13.4   7.20  )-----------( 207      ( 17 Oct 2020 01:31 )             41.1     10-    138  |  98  |  14  ----------------------------<  114<H>  3.7   |  27  |  0.67    Ca    10.5      17 Oct 2020 01:31    TPro  7.6  /  Alb  4.3  /  TBili  0.2  /  DBili  x   /  AST  39  /  ALT  29  /  AlkPhos  68  10-17          Urinalysis Basic - ( 17 Oct 2020 08:24 )    Color: Light Yellow / Appearance: Clear / S.029 / pH: x  Gluc: x / Ketone: Negative  / Bili: Negative / Urobili: Negative   Blood: x / Protein: Negative / Nitrite: Negative   Leuk Esterase: Negative / RBC: x / WBC x   Sq Epi: x / Non Sq Epi: x / Bacteria: x                      Consultant(s) Notes Reviewed:      Care Discussed with Consultants/Other Providers:

## 2020-10-18 NOTE — DISCHARGE NOTE PROVIDER - PROVIDER TOKENS
PROVIDER:[[id number]],FOLLOWUP:[1 week],ESTABLISHEDPATIENT:[T]] PROVIDER:[TOKEN:[[id number]],FOLLOWUP:[1 week],ESTABLISHEDPATIENT:[T]],PROVIDER:[[id number]],FOLLOWUP:[1 week],ESTABLISHEDPATIENT:[T]]

## 2020-10-18 NOTE — DISCHARGE NOTE PROVIDER - CARE PROVIDERS DIRECT ADDRESSES
,DirectAddress_Unknown ,DirectAddress_Unknown,[email or web address]

## 2020-10-19 ENCOUNTER — TRANSCRIPTION ENCOUNTER (OUTPATIENT)
Age: 70
End: 2020-10-19

## 2020-10-19 VITALS — DIASTOLIC BLOOD PRESSURE: 78 MMHG | SYSTOLIC BLOOD PRESSURE: 142 MMHG | HEART RATE: 66 BPM

## 2020-10-19 LAB
ANION GAP SERPL CALC-SCNC: 9 MMOL/L — SIGNIFICANT CHANGE UP (ref 5–17)
BUN SERPL-MCNC: 8 MG/DL — SIGNIFICANT CHANGE UP (ref 7–23)
CALCIUM SERPL-MCNC: 8.9 MG/DL — SIGNIFICANT CHANGE UP (ref 8.4–10.5)
CHLORIDE SERPL-SCNC: 107 MMOL/L — SIGNIFICANT CHANGE UP (ref 96–108)
CO2 SERPL-SCNC: 24 MMOL/L — SIGNIFICANT CHANGE UP (ref 22–31)
CREAT SERPL-MCNC: 0.53 MG/DL — SIGNIFICANT CHANGE UP (ref 0.5–1.3)
GLUCOSE SERPL-MCNC: 92 MG/DL — SIGNIFICANT CHANGE UP (ref 70–99)
HCT VFR BLD CALC: 34.9 % — SIGNIFICANT CHANGE UP (ref 34.5–45)
HGB BLD-MCNC: 11.4 G/DL — LOW (ref 11.5–15.5)
MCHC RBC-ENTMCNC: 27.7 PG — SIGNIFICANT CHANGE UP (ref 27–34)
MCHC RBC-ENTMCNC: 32.7 GM/DL — SIGNIFICANT CHANGE UP (ref 32–36)
MCV RBC AUTO: 84.7 FL — SIGNIFICANT CHANGE UP (ref 80–100)
NRBC # BLD: 0 /100 WBCS — SIGNIFICANT CHANGE UP (ref 0–0)
PLATELET # BLD AUTO: 160 K/UL — SIGNIFICANT CHANGE UP (ref 150–400)
POTASSIUM SERPL-MCNC: 3.6 MMOL/L — SIGNIFICANT CHANGE UP (ref 3.5–5.3)
POTASSIUM SERPL-SCNC: 3.6 MMOL/L — SIGNIFICANT CHANGE UP (ref 3.5–5.3)
RBC # BLD: 4.12 M/UL — SIGNIFICANT CHANGE UP (ref 3.8–5.2)
RBC # FLD: 13.4 % — SIGNIFICANT CHANGE UP (ref 10.3–14.5)
SODIUM SERPL-SCNC: 140 MMOL/L — SIGNIFICANT CHANGE UP (ref 135–145)
WBC # BLD: 4.24 K/UL — SIGNIFICANT CHANGE UP (ref 3.8–10.5)
WBC # FLD AUTO: 4.24 K/UL — SIGNIFICANT CHANGE UP (ref 3.8–10.5)

## 2020-10-19 PROCEDURE — 93970 EXTREMITY STUDY: CPT | Mod: 26

## 2020-10-19 PROCEDURE — 93880 EXTRACRANIAL BILAT STUDY: CPT

## 2020-10-19 PROCEDURE — 93306 TTE W/DOPPLER COMPLETE: CPT

## 2020-10-19 PROCEDURE — 71045 X-RAY EXAM CHEST 1 VIEW: CPT

## 2020-10-19 PROCEDURE — 99253 IP/OBS CNSLTJ NEW/EST LOW 45: CPT

## 2020-10-19 PROCEDURE — 36415 COLL VENOUS BLD VENIPUNCTURE: CPT

## 2020-10-19 PROCEDURE — 93005 ELECTROCARDIOGRAM TRACING: CPT

## 2020-10-19 PROCEDURE — 80307 DRUG TEST PRSMV CHEM ANLYZR: CPT

## 2020-10-19 PROCEDURE — 71275 CT ANGIOGRAPHY CHEST: CPT

## 2020-10-19 PROCEDURE — 82607 VITAMIN B-12: CPT

## 2020-10-19 PROCEDURE — 93970 EXTREMITY STUDY: CPT

## 2020-10-19 PROCEDURE — 81003 URINALYSIS AUTO W/O SCOPE: CPT

## 2020-10-19 PROCEDURE — U0003: CPT

## 2020-10-19 PROCEDURE — 82533 TOTAL CORTISOL: CPT

## 2020-10-19 PROCEDURE — 85379 FIBRIN DEGRADATION QUANT: CPT

## 2020-10-19 PROCEDURE — 84443 ASSAY THYROID STIM HORMONE: CPT

## 2020-10-19 PROCEDURE — 70450 CT HEAD/BRAIN W/O DYE: CPT

## 2020-10-19 PROCEDURE — 86803 HEPATITIS C AB TEST: CPT

## 2020-10-19 PROCEDURE — 93880 EXTRACRANIAL BILAT STUDY: CPT | Mod: 26

## 2020-10-19 PROCEDURE — 76376 3D RENDER W/INTRP POSTPROCES: CPT

## 2020-10-19 PROCEDURE — 80061 LIPID PANEL: CPT

## 2020-10-19 PROCEDURE — 85025 COMPLETE CBC W/AUTO DIFF WBC: CPT

## 2020-10-19 PROCEDURE — 80048 BASIC METABOLIC PNL TOTAL CA: CPT

## 2020-10-19 PROCEDURE — 80053 COMPREHEN METABOLIC PANEL: CPT

## 2020-10-19 PROCEDURE — 85027 COMPLETE CBC AUTOMATED: CPT

## 2020-10-19 PROCEDURE — 83880 ASSAY OF NATRIURETIC PEPTIDE: CPT

## 2020-10-19 PROCEDURE — 99285 EMERGENCY DEPT VISIT HI MDM: CPT | Mod: 25

## 2020-10-19 PROCEDURE — 84484 ASSAY OF TROPONIN QUANT: CPT

## 2020-10-19 PROCEDURE — 86769 SARS-COV-2 COVID-19 ANTIBODY: CPT

## 2020-10-19 RX ORDER — LOSARTAN POTASSIUM 100 MG/1
1 TABLET, FILM COATED ORAL
Qty: 30 | Refills: 0
Start: 2020-10-19 | End: 2020-11-17

## 2020-10-19 RX ORDER — ASPIRIN/CALCIUM CARB/MAGNESIUM 324 MG
1 TABLET ORAL
Qty: 30 | Refills: 0
Start: 2020-10-19 | End: 2020-11-17

## 2020-10-19 RX ORDER — LOSARTAN/HYDROCHLOROTHIAZIDE 100MG-25MG
1 TABLET ORAL
Qty: 30 | Refills: 0
Start: 2020-10-19 | End: 2020-11-17

## 2020-10-19 RX ORDER — ATORVASTATIN CALCIUM 80 MG/1
10 TABLET, FILM COATED ORAL AT BEDTIME
Refills: 0 | Status: DISCONTINUED | OUTPATIENT
Start: 2020-10-19 | End: 2020-10-19

## 2020-10-19 RX ORDER — LOSARTAN/HYDROCHLOROTHIAZIDE 100MG-25MG
1 TABLET ORAL
Qty: 0 | Refills: 0 | DISCHARGE

## 2020-10-19 RX ORDER — ANASTROZOLE 1 MG/1
1 TABLET ORAL
Qty: 30 | Refills: 0
Start: 2020-10-19 | End: 2020-11-17

## 2020-10-19 RX ORDER — ASPIRIN/CALCIUM CARB/MAGNESIUM 324 MG
81 TABLET ORAL DAILY
Refills: 0 | Status: DISCONTINUED | OUTPATIENT
Start: 2020-10-19 | End: 2020-10-19

## 2020-10-19 RX ORDER — LOSARTAN POTASSIUM 100 MG/1
50 TABLET, FILM COATED ORAL DAILY
Refills: 0 | Status: DISCONTINUED | OUTPATIENT
Start: 2020-10-19 | End: 2020-10-19

## 2020-10-19 RX ORDER — ATORVASTATIN CALCIUM 80 MG/1
1 TABLET, FILM COATED ORAL
Qty: 30 | Refills: 0
Start: 2020-10-19 | End: 2020-11-17

## 2020-10-19 RX ADMIN — SODIUM CHLORIDE 80 MILLILITER(S): 9 INJECTION INTRAMUSCULAR; INTRAVENOUS; SUBCUTANEOUS at 05:10

## 2020-10-19 RX ADMIN — LOSARTAN POTASSIUM 25 MILLIGRAM(S): 100 TABLET, FILM COATED ORAL at 05:10

## 2020-10-19 RX ADMIN — ANASTROZOLE 1 MILLIGRAM(S): 1 TABLET ORAL at 09:22

## 2020-10-19 NOTE — PROGRESS NOTE ADULT - SUBJECTIVE AND OBJECTIVE BOX
CARDIOLOGY     PROGRESS  NOTE   ________________________________________________    CHIEF COMPLAINT:Patient is a 70y old  Female who presents with a chief complaint of loc (19 Oct 2020 06:56)  doing better.  	  REVIEW OF SYSTEMS:  CONSTITUTIONAL: No fever, weight loss, or fatigue  EYES: No eye pain, visual disturbances, or discharge  ENT:  No difficulty hearing, tinnitus, vertigo; No sinus or throat pain  NECK: No pain or stiffness  RESPIRATORY: No cough, wheezing, chills or hemoptysis; No Shortness of Breath  CARDIOVASCULAR: No chest pain, palpitations, passing out, dizziness, or leg swelling  GASTROINTESTINAL: No abdominal or epigastric pain. No nausea, vomiting, or hematemesis; No diarrhea or constipation. No melena or hematochezia.  GENITOURINARY: No dysuria, frequency, hematuria, or incontinence  NEUROLOGICAL: No headaches, memory loss, loss of strength, numbness, or tremors  SKIN: No itching, burning, rashes, or lesions   LYMPH Nodes: No enlarged glands  ENDOCRINE: No heat or cold intolerance; No hair loss  MUSCULOSKELETAL: No joint pain or swelling; No muscle, back, or extremity pain  PSYCHIATRIC: No depression, anxiety, mood swings, or difficulty sleeping  HEME/LYMPH: No easy bruising, or bleeding gums  ALLERGY AND IMMUNOLOGIC: No hives or eczema	    [ ] All others negative	  [ ] Unable to obtain    PHYSICAL EXAM:  T(C): 36.9 (10-19-20 @ 04:44), Max: 37.2 (10-18-20 @ 12:24)  HR: 62 (10-19-20 @ 08:10) (62 - 65)  BP: 132/76 (10-19-20 @ 08:10) (132/76 - 150/77)  RR: 20 (10-19-20 @ 08:10) (18 - 20)  SpO2: 95% (10-19-20 @ 08:10) (92% - 99%)  Wt(kg): --  I&O's Summary    18 Oct 2020 07:01  -  19 Oct 2020 07:00  --------------------------------------------------------  IN: 2260 mL / OUT: 0 mL / NET: 2260 mL        Appearance: Normal	  HEENT:   Normal oral mucosa, PERRL, EOMI	  Lymphatic: No lymphadenopathy  Cardiovascular: Normal S1 S2, No JVD,+ murmurs, No edema  Respiratory: Lungs clear to auscultation	  Psychiatry: A & O x 3, Mood & affect appropriate  Gastrointestinal:  Soft, Non-tender, + BS	  Skin: No rashes, No ecchymoses, No cyanosis	  Neurologic: Non-focal  Extremities: Normal range of motion, No clubbing, cyanosis or edema  Vascular: Peripheral pulses palpable 2+ bilaterally    MEDICATIONS  (STANDING):  anastrozole 1 milliGRAM(s) Oral daily  aspirin enteric coated 81 milliGRAM(s) Oral daily  heparin   Injectable 5000 Unit(s) SubCutaneous every 12 hours  losartan 50 milliGRAM(s) Oral daily  sodium chloride 0.9%. 1000 milliLiter(s) (80 mL/Hr) IV Continuous <Continuous>      TELEMETRY: 	    ECG:  	  RADIOLOGY:  OTHER: 	  	  LABS:	 	    CARDIAC MARKERS:                                11.4   4.24  )-----------( 160      ( 19 Oct 2020 06:00 )             34.9     10-19    140  |  107  |  8   ----------------------------<  92  3.6   |  24  |  0.53    Ca    8.9      19 Oct 2020 06:00      proBNP: Serum Pro-Brain Natriuretic Peptide: 140 pg/mL (10-17 @ 01:31)    Lipid Profile: Cholesterol 207  LDL 98  HDL 91  TG 93    HgA1c:   TSH: Thyroid Stimulating Hormone, Serum: 1.16 uIU/mL (10-18 @ 09:27)          Assessment and plan  ---------------------------     70y Female complaining of LOC.   70 female, Hx: HTN ,  ca breast 2018 presents with multiple episodes of LOC  . Pt is accompanied by  who reports he head a thud this evening, and found his wife on the ground - patient was breathing but not responding and did not wake up spontaneously. Reports she eventually became responsive, and lost consciousness again. Pt denies any fevers, chills, nausea, vomiting, CP, SOB, abdominal pain, diarrhea, constipation, bloody stools, vaginal bleeding, denies any numbness/tingling/weakness in peripheral extremities. Denies any night sweats, weight loss, tremors.  multiple syncopal episodes  check orthostatic/ +severe orthostatic ?sec to hctz  ivf/rodrigo stockings  tele no arrythmia  echo noted  neuro eval/ mri brain  asa daily  lipid panel  dvt prophylaxis  tsh noted  echo normal  orthostatic has resolved  stress test as out pt

## 2020-10-19 NOTE — PROGRESS NOTE ADULT - SUBJECTIVE AND OBJECTIVE BOX
Interval History - Patient seen and examined this am. no focal complaints still with fluctuating BP            Vital Signs Last 24 Hrs  T(C): 36.9 (19 Oct 2020 04:44), Max: 37.2 (18 Oct 2020 12:24)  T(F): 98.4 (19 Oct 2020 04:44), Max: 98.9 (18 Oct 2020 12:24)  HR: 62 (19 Oct 2020 08:10) (62 - 65)  BP: 132/76 (19 Oct 2020 08:10) (132/76 - 150/77)  BP(mean): --  RR: 20 (19 Oct 2020 08:10) (18 - 20)  SpO2: 95% (19 Oct 2020 08:10) (92% - 99%)    PHYSICAL EXAM:        Neurological Exam:  Mental Status - Patient is alert, awake, oriented X3. fluent, names, no dysarthria no aphasia Follows commands well and able to answer questions appropriately. Mood and affect  normal    Cranial Nerves - PERRL, EOMI, VFF, V1-V3 intact, no gross facial asymmetry, tongue/uvula midline    Motor Exam -   Right upper 5/5   Left upper 5/5  Right lower 5/5  Left lower 5/5     nml bulk/tone    Sensory    Intact to light touch and pinprick bilaterally    Coord: FTN intact bilaterally     Gait -  normal      Reflexes:          2+ throughout          Medications  anastrozole 1 milliGRAM(s) Oral daily  aspirin enteric coated 81 milliGRAM(s) Oral daily  heparin   Injectable 5000 Unit(s) SubCutaneous every 12 hours  losartan 50 milliGRAM(s) Oral daily  sodium chloride 0.9%. 1000 milliLiter(s) IV Continuous <Continuous>      Lab      Radiology

## 2020-10-19 NOTE — PROGRESS NOTE ADULT - ASSESSMENT
70 female,   Hx: HTN ,  Ca  breast  2018,  s/p  lumpectomy/ path, invasive  ductal  cancer  - presents with multiple   syncopal episodes    reported, that  he head a thud this evening, and found his wife on the ground - patient was breathing but not responding and did not wake up spontaneously. Reports she eventually became responsive, and lost consciousness again. Pt denies any fevers,  CP, SOB, abdominal pain,    syncope  normal  troponin/  ct  head,  normal  alcohol, negative   tele  monitoring,  NSR  Cy  angio  chest,  no  PE/  mlple  small  nodules/ GOO, left upper lung  hold   hctz for  now  neuro  and  card  eval  HTN,  on  cozaar  echo,    normal  Ef  positive   orthostatics. on iv  fluids  pt  does  not  want  mri here, as  it is  not  an  open  mri    abl e to ambulate   on  dvt  ppx/  needs  f/p  ct chest  in  2   months   check  orthostatics today   leg dopplers, pennding       ec< from: CT Angio Chest w/ IV Cont (10.17.20 @ 02:45) >  IMPRESSION:  No evidence of main, lobar, or proximal segmental pulmonary artery embolism. Limited evaluation the distal segmental and subsegmental pulmonary arteries.  Small focal subpleural groundglass opacity in the left upper lobe measuring 1 cm, nonspecific, potentially infectious or inflammatory, although a follow-up chest CT in 6 months should be performed to document resolution or stability.    Several scattered solid lung nodules measuring under 6 mm, nonspecific. In a low risk patient, no routine follow-up is needed. In a high-risk patient, consider follow-up chest CT in 12 months.  < end of copied text >       rd< from: CT Head No Cont (10.17.20 @ 01:47) >  MPRESSION:  No acute intracranial hemorrhage, large territorial infarct, or mass effect.    < end of copied text >   70 female,   Hx: HTN ,  Ca  breast  2018,  s/p  lumpectomy/ path, invasive  ductal  cancer  - presents with multiple   syncopal episodes    reported, that  he head a thud this evening, and found his wife on the ground - patient was breathing but not responding and did not wake up spontaneously. Reports she eventually became responsive, and lost consciousness again. Pt denies any fevers,  CP, SOB, abdominal pain,    syncope  normal  troponin/  ct  head,  normal  alcohol, negative   tele  monitoring,  NSR  Cy  angio  chest,  no  PE/  mlple  small  nodules/ GOO, left upper lung  hold   hctz for  now  neuro  and  card  eval  HTN,  on  cozaar  echo,    normal  Ef  positive   orthostatics. on iv  fluids  pt  does  not  want  mri here, as  it is  not  an  open  mri    abl e to ambulate   on  dvt  ppx/  needs  f/p  ct chest  in  2   months   check  orthostatics today   leg dopplers,  negative  carotid  dopplers   with   69 %  stenosis, Left ica/  discussed  with  neuro, no  intervention/ prt  to  f/p  with neuro/  lipitor  started       ec< from: CT Angio Chest w/ IV Cont (10.17.20 @ 02:45) >  IMPRESSION:  No evidence of main, lobar, or proximal segmental pulmonary artery embolism. Limited evaluation the distal segmental and subsegmental pulmonary arteries.  Small focal subpleural groundglass opacity in the left upper lobe measuring 1 cm, nonspecific, potentially infectious or inflammatory, although a follow-up chest CT in 6 months should be performed to document resolution or stability.    Several scattered solid lung nodules measuring under 6 mm, nonspecific. In a low risk patient, no routine follow-up is needed. In a high-risk patient, consider follow-up chest CT in 12 months.  < end of copied text >       rd< from: CT Head No Cont (10.17.20 @ 01:47) >  MPRESSION:  No acute intracranial hemorrhage, large territorial infarct, or mass effect.    < end of copied text >

## 2020-10-19 NOTE — DISCHARGE NOTE NURSING/CASE MANAGEMENT/SOCIAL WORK - NSDCFUADDAPPT_GEN_ALL_CORE_FT
Follow-up with Dr Gaston (PCP) for a repeat CT Chest in 2 months.    Follow up with Dr Vale (vascular surgery) for routine surveillance of left internal carotid artery (found to have moderate stenosis).

## 2020-10-19 NOTE — CONSULT NOTE ADULT - SUBJECTIVE AND OBJECTIVE BOX
HPI:  Mary is a very pleasant 70 year old lady with history of HTN, L breast CA s/p lumpectomy 2018, on anastrozole since then, presenting with syncope. Per pts , he heard a thud and found his wife on the ground - patient was breathing but not responding and did not wake up spontaneously. She eventually became responsive, and lost consciousness again. Pt states she has never had a similar episode before. No headache or any neurologic symptoms before syncopal event and at this time. No known family hx of HLD or stroke. Pt reports she lives a healthy lifestyle, runs 5 miles per day, and eats healthy. Denies any other complaints including recent illness/sick contacts, fevers/chills, chest pain/shortness of breath, nausea/vomiting, diarrhea/constipation. Denies any facial drooping, numbness/tingling/weakness in peripheral extremities. Denies any night sweats, weight loss, tremors.     PMHx: Osteoporosis    Hypertension      PSHx: No significant past surgical history      Medications (inpatient): anastrozole 1 milliGRAM(s) Oral daily  atorvastatin 10 milliGRAM(s) Oral at bedtime  heparin   Injectable 5000 Unit(s) SubCutaneous every 12 hours  losartan 50 milliGRAM(s) Oral daily    Medications (PRN):  Allergies: Cipro (Rash)  penicillin (Rash)  (Intolerances: )  Social Hx:   Family Hx:     T(C): 36.9  HR: 66 (62 - 66)  BP: 142/78 (132/76 - 150/77)  RR: 20 (18 - 20)  SpO2: 95% (92% - 95%)  Tmax: T(C): , Max: 36.9 (10-19-20 @ 04:44)    10-18-20  -  10-19-20  --------------------------------------------------------  IN:    Oral Fluid: 1300 mL    sodium chloride 0.9%: 960 mL  Total IN: 2260 mL    OUT:  Total OUT: 0 mL    Total NET: 2260 mL      10-19-20  -  10-19-20  --------------------------------------------------------  IN:    Oral Fluid: 540 mL  Total IN: 540 mL    OUT:  Total OUT: 0 mL    Total NET: 540 mL          Physical Exam:  General: Well-developed, in no acute distress   Neurologic: Awake, alert, GCS 15, No focal Deficits   Respiratory: Normal respiratory effort  CVS: RRR, perfusing adequately  Abdomen: Abdomen soft, NT/ND, no rebound or guarding   Ext: Grossly symmetric, Moving all extremities  Vascular: 2+ peripheral pulses bilaterally throughout; no edema appreciated  Skin: Warm, dry, intact, no erythema     Labs:                        11.4   4.24  )-----------( 160      ( 19 Oct 2020 06:00 )             34.9       10-19    140  |  107  |  8   ----------------------------<  92  3.6   |  24  |  0.53    Ca    8.9      19 Oct 2020 06:00              Imaging and other studies:  < from: [facility] (10.19.20 @ 09:17) >    EXAM:  CAROTID DUPLEX BILATERAL                            PROCEDURE DATE:  10/19/2020            INTERPRETATION:  CLINICAL INFORMATION: Syncope    COMPARISON: None available.    TECHNIQUE: Grayscale, color and spectral Doppler examination of both carotid arteries was performed.    FINDINGS:    Prominent at the bifurcation of the left common carotid arteries into internal and external branches are calcified atheromatous plaques.    Blood flow velocities are as follows:    RIGHT:  PROX CCA = 94 ;  DIST CCA = 75 ;  PROX ICA = 81 ;  DIST ICA = 57 ;  ECA = 93    LEFT:  PROX CCA = 77 ;  DIST CCA = 63 ;  PROX ICA = 128 ;  DIST ICA = 70 ;  ECA = 83    Antegrade flow is noted within both vertebral arteries.    IMPRESSION: There is a moderate, 50-69% stenosis of the left internal carotid artery.    Measurement of carotid stenosis is based on velocity parameters that correlate the residual internal carotid diameter with that of the more distal vessel in accordance with a method such as the [employer] Symptomatic Carotid Endarterectomy Trial (NASCET).                      FEDE VENTURA M.D., ATTENDING RADIOLOGIST  This document has been electronically signed. Oct 19 2020 10:37AM    < end of copied text >

## 2020-10-19 NOTE — CONSULT NOTE ADULT - ATTENDING COMMENTS
I have discussed the case with the surgical house staff. I have personally seen, examined, and participated in the care of this patient. I have reviewed all pertinent clinical information.    Admitted with syncope. Thought to be related to her antihypertensives.  No history of TIA or stroke symptoms.   H/o hypertension. Nonsmoker.   On exam, CN II-XII grossly intact. motor and sensory intact in all four extremities.    Carotid duplex reviewed by me.  Moderate grade left carotid stenosis.    For this asymptomatic moderate grade left carotid stenosis, recommended medical management.  Please start aspirin 81mg, statin, BP control.  Outpatient followup.

## 2020-10-19 NOTE — DISCHARGE NOTE NURSING/CASE MANAGEMENT/SOCIAL WORK - PATIENT PORTAL LINK FT
You can access the FollowMyHealth Patient Portal offered by [facility] by registering at the following website: [email or web address]. By joining [facility]’s FollowMyHealth portal, you will also be able to view your health information using other applications (apps) compatible with our system.

## 2020-10-19 NOTE — PROGRESS NOTE ADULT - SUBJECTIVE AND OBJECTIVE BOX
no  complaints    REVIEW OF SYSTEMS:  GEN: no fever,    no chills  RESP: no SOB,   no cough  CVS: no chest pain,   no palpitations  GI: no abdominal pain,   no nausea,   no vomiting,   no constipation,   no diarrhea  : no dysuria,   no frequency  NEURO: no headache,   no dizziness  PSYCH: no depression,   not anxious  Derm : no rash    MEDICATIONS  (STANDING):  anastrozole 1 milliGRAM(s) Oral daily  aspirin enteric coated 81 milliGRAM(s) Oral daily  heparin   Injectable 5000 Unit(s) SubCutaneous every 12 hours  losartan 25 milliGRAM(s) Oral daily  sodium chloride 0.9%. 1000 milliLiter(s) (80 mL/Hr) IV Continuous <Continuous>    MEDICATIONS  (PRN):      Vital Signs Last 24 Hrs  T(C): 36.9 (19 Oct 2020 04:44), Max: 37.2 (18 Oct 2020 12:24)  T(F): 98.4 (19 Oct 2020 04:44), Max: 98.9 (18 Oct 2020 12:24)  HR: 65 (19 Oct 2020 04:44) (65 - 65)  BP: 150/77 (19 Oct 2020 04:44) (150/77 - 150/77)  BP(mean): --  RR: 18 (19 Oct 2020 04:44) (18 - 18)  SpO2: 92% (19 Oct 2020 04:44) (92% - 99%)  CAPILLARY BLOOD GLUCOSE        I&O's Summary    17 Oct 2020 07:01  -  18 Oct 2020 07:00  --------------------------------------------------------  IN: 1180 mL / OUT: 1201 mL / NET: -21 mL    18 Oct 2020 07:01  -  19 Oct 2020 06:56  --------------------------------------------------------  IN: 2260 mL / OUT: 0 mL / NET: 2260 mL        PHYSICAL EXAM:  HEAD:  Atraumatic, Normocephalic  NECK: Supple, No   JVD  CHEST/LUNG:   no     rales,     no,    rhonchi  HEART: Regular rate and rhythm;         murmur  ABDOMEN: Soft, Nontender, ;   EXTREMITIES:    no    edema  NEUROLOGY:  alert    LABS:                        11.4   4.24  )-----------( 160      ( 19 Oct 2020 06:00 )             34.9     10-19    140  |  107  |  8   ----------------------------<  92  3.6   |  24  |  0.53    Ca    8.9      19 Oct 2020 06:00            Urinalysis Basic - ( 17 Oct 2020 08:24 )    Color: Light Yellow / Appearance: Clear / S.029 / pH: x  Gluc: x / Ketone: Negative  / Bili: Negative / Urobili: Negative   Blood: x / Protein: Negative / Nitrite: Negative   Leuk Esterase: Negative / RBC: x / WBC x   Sq Epi: x / Non Sq Epi: x / Bacteria: x              Thyroid Stimulating Hormone, Serum: 1.16 uIU/mL (10-18 @ 09:27)          Consultant(s) Notes Reviewed:      Care Discussed with Consultants/Other Providers:

## 2020-10-19 NOTE — CONSULT NOTE ADULT - ASSESSMENT
ASSESSMENT:  Mary is a very pleasant 70 year old lady with history of HTN, admitted for syncope workup, and found to have asymptomatic left carotid artery stenosis    PLAN:  - No acute vascular surgical intervention at this time: Patient does not meet criteria for surgical management  - Recommend beginning her on a Statin, continuing strict BP control and Aspirin  - Discussed with vascular surgery attending      Calvin Mtz, PGY 3  Surgery [phone]

## 2020-10-19 NOTE — PROGRESS NOTE ADULT - ASSESSMENT
70 female, Hx: HTN ,  ca breast 2018 here with episode of LOC, PE non-focal +orthostatic. CTH tte - Likely syncope    Cardiology note appreciated  tte-  Carotid dopplers pending read  No further inpt Neuro w/u

## 2020-10-22 ENCOUNTER — EMERGENCY (EMERGENCY)
Facility: HOSPITAL | Age: 70
LOS: 1 days | Discharge: HOME | End: 2020-10-22
Attending: EMERGENCY MEDICINE
Payer: COMMERCIAL

## 2020-10-22 VITALS
SYSTOLIC BLOOD PRESSURE: 164 MMHG | WEIGHT: 123.9 LBS | DIASTOLIC BLOOD PRESSURE: 79 MMHG | OXYGEN SATURATION: 98 % | RESPIRATION RATE: 18 BRPM | HEIGHT: 65 IN | HEART RATE: 85 BPM | TEMPERATURE: 98 F

## 2020-10-22 VITALS
DIASTOLIC BLOOD PRESSURE: 90 MMHG | HEART RATE: 86 BPM | OXYGEN SATURATION: 100 % | RESPIRATION RATE: 18 BRPM | SYSTOLIC BLOOD PRESSURE: 150 MMHG

## 2020-10-22 VITALS
DIASTOLIC BLOOD PRESSURE: 76 MMHG | WEIGHT: 123.9 LBS | TEMPERATURE: 98 F | SYSTOLIC BLOOD PRESSURE: 148 MMHG | RESPIRATION RATE: 16 BRPM | HEIGHT: 65 IN | OXYGEN SATURATION: 99 % | HEART RATE: 78 BPM

## 2020-10-22 VITALS — DIASTOLIC BLOOD PRESSURE: 78 MMHG | TEMPERATURE: 98 F | SYSTOLIC BLOOD PRESSURE: 156 MMHG | HEART RATE: 77 BPM

## 2020-10-22 PROCEDURE — 99284 EMERGENCY DEPT VISIT MOD MDM: CPT

## 2020-10-22 PROCEDURE — 73630 X-RAY EXAM OF FOOT: CPT | Mod: 26,LT

## 2020-10-22 PROCEDURE — 70450 CT HEAD/BRAIN W/O DYE: CPT | Mod: 26

## 2020-10-22 RX ORDER — ACETAMINOPHEN 500 MG
650 TABLET ORAL ONCE
Refills: 0 | Status: COMPLETED | OUTPATIENT
Start: 2020-10-22 | End: 2020-10-22

## 2020-10-22 RX ORDER — ONDANSETRON 8 MG/1
4 TABLET, FILM COATED ORAL ONCE
Refills: 0 | Status: COMPLETED | OUTPATIENT
Start: 2020-10-22 | End: 2020-10-22

## 2020-10-22 RX ORDER — ONDANSETRON 8 MG/1
1 TABLET, FILM COATED ORAL
Qty: 20 | Refills: 0
Start: 2020-10-22

## 2020-10-22 RX ADMIN — Medication 650 MILLIGRAM(S): at 20:21

## 2020-10-22 RX ADMIN — ONDANSETRON 4 MILLIGRAM(S): 8 TABLET, FILM COATED ORAL at 20:22

## 2020-10-22 NOTE — ED ADULT NURSE NOTE - OBJECTIVE STATEMENT
patient 70 year old female with PMH of HTN, osteoporsis who presents to the ED from home complaining of HA. patient s/p fall on 10/16. patient was in hospital for 2 days and discharged home. patient seen in ED today and discharged home. patient states the headache is "getting worse and I threw up 4 times" patient is aaxo3, PERRL 2mm in size, strength and sensation equal to upper and lower extremities bilaterally, lungs CTA bilaterally, abd soft, nondistended, nontender, cap refill <3, radial pulses +2 bilaterally. patient denies chest pain, shortness of breath, ha, dizziness, weakness, numbness or tingling, fever, chills, abd pain, back pain, diarrhea, blurry vision, changes in bowel or bladder, hematuria, bloody stool. patient comfort and safety provided. will continue to monitor.

## 2020-10-22 NOTE — ED ADULT TRIAGE NOTE - CHIEF COMPLAINT QUOTE
Headache with n/v. Fall on 10/17 was seen and admitted, seen here this morning for same symptoms, dx with concussion as per pt.

## 2020-10-22 NOTE — ED PROVIDER NOTE - CARE PLAN
Principal Discharge DX:	Headache   Principal Discharge DX:	Headache  Secondary Diagnosis:	Neck pain   Principal Discharge DX:	Acute headache  Secondary Diagnosis:	Neck pain

## 2020-10-22 NOTE — ED PROVIDER NOTE - CARE PROVIDER_API CALL
King Gaston)  Internal Medicine  [address] [place] [address] [id number]  Phone: [phone]  Fax: [phone]  Established Patient  Follow Up Time: 4-6 Days

## 2020-10-22 NOTE — ED PROVIDER NOTE - NEUROLOGICAL, MLM
Alert and oriented, no focal deficits, no motor or sensory deficits. No nystagmus. Alert and oriented, no focal deficits, no motor or sensory deficits. Normal gait. No nystagmus.

## 2020-10-22 NOTE — ED PROVIDER NOTE - PROGRESS NOTE DETAILS
DH: CT head negative, XR wet read w/o acute fx or dislocations. Likely tension-type HA secondary to whiplash neck trauma from fall. Will give supportive treatment. Has appt within 1 week w/ PCP Dr. Gaston. Currently notes mild HA but declined Tylenol. Discussed return precautions and all questions answered. Pt in agreement w/ plan. CAOx3, NAD, VSS. Stable for d/c.

## 2020-10-22 NOTE — ED ADULT TRIAGE NOTE - CHIEF COMPLAINT QUOTE
headache since yesterday with nausea   recently dc from hospital for near syncope, recently had changes to bp meds

## 2020-10-22 NOTE — ED PROVIDER NOTE - OBJECTIVE STATEMENT
70y F PMHx HTN p/w sudden onset intermittent diffuse HA today with nausea and 4 episodes of vomiting. At this time, her HA is mild. Pt is returning to the ED after being discharged this morning with no findings on imaging. Pt further reports that she was admitted to the ED a week ago due to syncope. Her  heard a thump and found her on the floor. ECHO and CT were benign. She does not remember why she lost consciousness so she is not sure whether she hit her head. Her doctor suspected that her syncopal episode was due to low blood pressure.  She took Tylenol for the headache around 6pm but it came up in her vomit. Endorses anorexia. 70y F PMHx HTN p/w sudden onset intermittent diffuse HA today with nausea and 4 episodes of vomiting. At this time, her HA is mild. Pt is returning to the ED after being discharged this morning with no findings on imaging. Pt further reports that she was admitted to the ED a week ago due to syncope. Her  heard a thump and found her on the floor. ECHO and CT were benign. She does not remember why she lost consciousness and she is not sure whether she hit her head. Her doctor suspected that her syncopal episode was due to low blood pressure.  She took Tylenol for the headache around 6pm but it came up in her vomit. Endorses anorexia.

## 2020-10-22 NOTE — ED PROVIDER NOTE - NSFOLLOWUPINSTRUCTIONS_ED_ALL_ED_FT
You were seen and evaluated in the emergency department for a headache. Your exam, workup and imaging were reassuring. We have recommended a course of treatment that should improve your headache. Please follow up with your regular provider in the next 2-3 days. Please read the attached information sheets which provide useful information pertinent to your condition.    It is important to understand that no medical workup can completely exclude all concerning conditions. Therefore, we ask that you please return for worsening or concerning new symptoms including but not limited to changes to your vision, sensory changes such as numbness, motor weakness (loss of strength or inability to properly use parts of your body), dizziness or loss of coordination, loss of conciousness, significant worsening of the pain, or other worsening or concerning new symptoms.    Please follow up with your regular providers within the next 2-3 days. You were seen and evaluated in the emergency department for a headache. Your exam, workup and imaging were reassuring. We have recommended a course of treatment that should improve your headache. Please follow up with your regular provider in the next 2-3 days.    Take Tylenol 650mg (Two 325 mg pills) every 4-6 hours as needed for pain. Apply warm compresses to the back of the neck throughout the day to help loosen the muscle tension. Salonpas patch can be purchased over the counter from the pharmacy - apply to the base of the neck as instructed to treat neck pain.     It is important to understand that no medical workup can completely exclude all concerning conditions. Therefore, we ask that you please return for worsening or concerning new symptoms including but not limited to changes to your vision, sensory changes such as numbness, motor weakness (loss of strength or inability to properly use parts of your body), dizziness or loss of coordination, loss of consciousness, significant worsening of the pain, or other worsening or concerning new symptoms.

## 2020-10-22 NOTE — ED ADULT NURSE REASSESSMENT NOTE - NS ED NURSE REASSESS COMMENT FT1
0849: Transport present, patient to CT scan, DC
1100: Resident at bedside, results reviewed, patient cleared for discharge will follow up with PCP, STEPHEN

## 2020-10-22 NOTE — ED PROVIDER NOTE - OBJECTIVE STATEMENT
70 year old female PMH HTN, HLD, left ICA stenosis 50-69%, breast cancer, presents with  to ED for headache. She was admitted 10/17-10/19 after syncope with collapse at home. Had negative CT head, negative LE duplex, TTE EF 59%, and carotid doppler w/ 50-69% L ICA stenosis. She was discharged w/ medication changes of d/c HCTZ and Losartan to 50mg. She felt well after d/c but yesterday began experiencing headaches. Gradual onset, migratory location, "pulsating" quality. Worse when lying in bed, better when up and moving or with Tylenol. Last Tylenol 2a today. A/w posterior neck pain. HA is 1/10 currently. No visual changes, dizziness, syncope, tinnitus, cp, sob, abd pain, n/v, weakness, or numbness/tingling.

## 2020-10-22 NOTE — ED PROVIDER NOTE - NS ED ROS FT
GENERAL: no fever, no chills  EYES: no change in vision, no irritation  HEENT: no trouble swallowing or speaking  CARDIAC: no chest pain, no palpitations   PULMONARY: no cough, no shortness of breath, no wheezing  GI: no abdominal pain, no nausea, no vomiting, no diarrhea, no constipation  : no changes in urination, no dysuria, no frequency  SKIN: no rashes  NEURO: +headache, no numbness, no weakness  MSK: no joint pain, +muscle pain, +neck pain, no back pain, no calf pain     -Vijay Sharp, PGY2

## 2020-10-22 NOTE — ED PROVIDER NOTE - PHYSICAL EXAMINATION
GENERAL: A&Ox3, non-toxic appearing, no acute distress  HEENT: NCAT, EOMI, oral mucosa moist, normal conjunctiva  RESP: CTAB, no respiratory distress, no wheezes/rhonchi/rales, speaking in full sentences  CV: RRR, no murmurs/rubs/gallops  ABDOMEN: soft, non-tender, non-distended, no guarding  MSK: mild upper paracervical ttp, normal neck ROM, lateral L hindfoot ecchymosis w/ mild ttp, normal L ankle ROM  NEURO: no focal sensory or motor deficits, normal CN exam, PERRLA, SILT, 5/5 strength in all extremities  SKIN: warm, normal color, well perfused, no rash  PSYCH: normal affect    -Vijay Sharp, PGY2

## 2020-10-22 NOTE — ED PROVIDER NOTE - NSFOLLOWUPINSTRUCTIONS_ED_ALL_ED_FT
Follow up with the [facility] in 3-5 days. Please call [phone] to make an appointment.    -- Please use 650mg Tylenol (also called acetaminophen) every 4 hours & 600mg Motrin (also called Advil or ibuprofen) every 6 hours as needed for pain/discomfort/swelling. You can get these without a prescription. Don't use more than 3500mg of Tylenol in any 24-hour period. Make sure your other prescription/over-the-counter medications don't contain any Tylenol so you don't take too much. If you have any stomach discomfort while taking Motrin, you can use TUMS or Pepcid or Zantac (these can all be bought without a prescription).     Follow up with your primary care physician within 2-3days for reevaluation.  Return to the Emergency Department for worsening, progressive or any other concerning symptoms.     Eat lightly, no sedatives    Take zofran for nausea as needed every 8 hrs

## 2020-10-22 NOTE — ED PROVIDER NOTE - PATIENT PORTAL LINK FT
You can access the FollowMyHealth Patient Portal offered by [facility] by registering at the following website: [email or web address]. By joining [employer]’s FollowMyHealth portal, you will also be able to view your health information using other applications (apps) compatible with our system.

## 2020-10-22 NOTE — ED PROVIDER NOTE - CLINICAL SUMMARY MEDICAL DECISION MAKING FREE TEXT BOX
Vijay Sharp, PGY2: 70 year old female p/w gradual onset, intermittent HA x2 days. Recent admission for syncope and collapse w/ negative workup. Neuro intact, paracervical ttp, L foot ecchymosis. Plan for CT head r/o delayed ICH, XR L foot r/o fx. Offered pain control and declined. DDx of exclusion: post-concussive syndrome. Will reassess after imaging.

## 2020-10-22 NOTE — ED PROVIDER NOTE - CLINICAL SUMMARY MEDICAL DECISION MAKING FREE TEXT BOX
Alex Hubbard (MD): Reviewed neuro and cardiology notes as well as internal medicine. All imaging OK. Did not have a CT angio. However after discussion with pt, sx not consistent with subarachnoid hemhorrhage. Therefore CTA unlikely to be productive today. Will not pursue. Considered CT scan to evaluate subdural in context of aspirin use, however pt does not have injury to her head and waxing and waning of same sx since injury. Pt was not discharged with antiemetics, will trial and re-assess. Alex Hubbard (MD): Reviewed neuro and cardiology notes as well as internal medicine. All imaging OK. Did not have a CT angio. However after discussion with pt, sx not consistent with subarachnoid hemorrhage. Therefore CTA unlikely to be productive today. Will not pursue. Only inj was 1 wk ago. Considered CT scan to evaluate subdural in context of aspirin use, however pt does not have injury to her head and waxing and waning of same sx since injury. Pt was not discharged with antiemetics, will trial and re-assess.

## 2020-10-22 NOTE — ED PROVIDER NOTE - PATIENT PORTAL LINK FT
You can access the FollowMyHealth Patient Portal offered by [facility] by registering at the following website: [email or web address]. By joining [employer]’s FollowMyHealth portal, you will also be able to view your health information using other applications (apps) compatible with our system.

## 2020-10-22 NOTE — ED PROVIDER NOTE - PROGRESS NOTE DETAILS
Pt oob ambulating with steady gait without dizziness .  Pt tolerating po with no nausea.   Will dc home  with concussion clinic

## 2020-10-22 NOTE — ED PROVIDER NOTE - ATTENDING CONTRIBUTION TO CARE
Attending MD Foote:  I personally have seen and examined this patient.  Resident note reviewed and agree on plan of care and except where noted.  See HPI, PE, and MDM for details.     Pt presenting with intermittent gradual onset posterior headache pain, non-focal neuro exam. In setting of recent syncopal episodes (hospitalized for this with unrevealing work up) with head strike, likely post concussive syndrome but will obtain CT head to ro delayed ICH       *The above represents an initial assessment/impression. Please refer to progress notes for potential changes in patient clinical course*

## 2020-10-22 NOTE — ED ADULT NURSE NOTE - OBJECTIVE STATEMENT
70 year old female ambulated with steady gait to room from triage states recent discharge from hospital for syncopal episode 10/17, states headache on and off with mild nausea, able to change into gown with no distress,  A/O x4 VSS afebrile, PERRLA speaking in full clear sentences no slurred speech no droop noted, Lung sound clear no resp distress Skin W/P/M able to move all extremities with equal strength/sensation +pulses, ecchymosis noted to L side foot, denies fever, recent COVID swab negative, Resident at bedside eval in progress, CM in place, DC 70 year old female ambulated with steady gait to room from triage states recent discharge from hospital for syncopal episode 10/17, states headache on and off with mild nausea, with relief when she takes tylenol and sits outside in fresh air able to change into gown with no distress,  A/O x4 VSS afebrile, PERRLA speaking in full clear sentences no slurred speech no droop noted, Lung sound clear no resp distress Skin W/P/M able to move all extremities with equal strength/sensation +pulses, ecchymosis noted to L side foot, denies fever, recent COVID swab negative, Resident at bedside eval in progress, CM in place, DC

## 2020-10-23 ENCOUNTER — INPATIENT (INPATIENT)
Facility: HOSPITAL | Age: 70
LOS: 3 days | Discharge: HOME | DRG: 645 | End: 2020-10-27
Attending: INTERNAL MEDICINE | Admitting: INTERNAL MEDICINE
Payer: COMMERCIAL

## 2020-10-23 VITALS
DIASTOLIC BLOOD PRESSURE: 78 MMHG | HEART RATE: 84 BPM | RESPIRATION RATE: 18 BRPM | SYSTOLIC BLOOD PRESSURE: 167 MMHG | WEIGHT: 123.9 LBS | TEMPERATURE: 98 F | HEIGHT: 65 IN | OXYGEN SATURATION: 95 %

## 2020-10-23 DIAGNOSIS — Z98.890 OTHER SPECIFIED POSTPROCEDURAL STATES: Chronic | ICD-10-CM

## 2020-10-23 DIAGNOSIS — E87.1 HYPO-OSMOLALITY AND HYPONATREMIA: ICD-10-CM

## 2020-10-23 LAB
ALBUMIN SERPL ELPH-MCNC: 4.2 G/DL — SIGNIFICANT CHANGE UP (ref 3.3–5)
ALP SERPL-CCNC: 66 U/L — SIGNIFICANT CHANGE UP (ref 40–120)
ALT FLD-CCNC: 28 U/L — SIGNIFICANT CHANGE UP (ref 10–45)
ANION GAP SERPL CALC-SCNC: 17 MMOL/L — SIGNIFICANT CHANGE UP (ref 5–17)
APPEARANCE UR: CLEAR — SIGNIFICANT CHANGE UP
AST SERPL-CCNC: 37 U/L — SIGNIFICANT CHANGE UP (ref 10–40)
BASOPHILS # BLD AUTO: 0.01 K/UL — SIGNIFICANT CHANGE UP (ref 0–0.2)
BASOPHILS NFR BLD AUTO: 0.1 % — SIGNIFICANT CHANGE UP (ref 0–2)
BILIRUB SERPL-MCNC: 0.6 MG/DL — SIGNIFICANT CHANGE UP (ref 0.2–1.2)
BILIRUB UR-MCNC: NEGATIVE — SIGNIFICANT CHANGE UP
BUN SERPL-MCNC: 11 MG/DL — SIGNIFICANT CHANGE UP (ref 7–23)
CALCIUM SERPL-MCNC: 9.3 MG/DL — SIGNIFICANT CHANGE UP (ref 8.4–10.5)
CHLORIDE SERPL-SCNC: 76 MMOL/L — LOW (ref 96–108)
CO2 SERPL-SCNC: 22 MMOL/L — SIGNIFICANT CHANGE UP (ref 22–31)
COLOR SPEC: YELLOW — SIGNIFICANT CHANGE UP
CREAT SERPL-MCNC: 0.48 MG/DL — LOW (ref 0.5–1.3)
DIFF PNL FLD: NEGATIVE — SIGNIFICANT CHANGE UP
EOSINOPHIL # BLD AUTO: 0 K/UL — SIGNIFICANT CHANGE UP (ref 0–0.5)
EOSINOPHIL NFR BLD AUTO: 0 % — SIGNIFICANT CHANGE UP (ref 0–6)
GAS PNL BLDV: SIGNIFICANT CHANGE UP
GLUCOSE SERPL-MCNC: 87 MG/DL — SIGNIFICANT CHANGE UP (ref 70–99)
GLUCOSE UR QL: NEGATIVE — SIGNIFICANT CHANGE UP
HCT VFR BLD CALC: 35 % — SIGNIFICANT CHANGE UP (ref 34.5–45)
HGB BLD-MCNC: 12.7 G/DL — SIGNIFICANT CHANGE UP (ref 11.5–15.5)
IMM GRANULOCYTES NFR BLD AUTO: 0.3 % — SIGNIFICANT CHANGE UP (ref 0–1.5)
KETONES UR-MCNC: ABNORMAL
LEUKOCYTE ESTERASE UR-ACNC: NEGATIVE — SIGNIFICANT CHANGE UP
LYMPHOCYTES # BLD AUTO: 0.93 K/UL — LOW (ref 1–3.3)
LYMPHOCYTES # BLD AUTO: 9.3 % — LOW (ref 13–44)
MCHC RBC-ENTMCNC: 28.2 PG — SIGNIFICANT CHANGE UP (ref 27–34)
MCHC RBC-ENTMCNC: 36.3 GM/DL — HIGH (ref 32–36)
MCV RBC AUTO: 77.6 FL — LOW (ref 80–100)
MONOCYTES # BLD AUTO: 0.63 K/UL — SIGNIFICANT CHANGE UP (ref 0–0.9)
MONOCYTES NFR BLD AUTO: 6.3 % — SIGNIFICANT CHANGE UP (ref 2–14)
NEUTROPHILS # BLD AUTO: 8.37 K/UL — HIGH (ref 1.8–7.4)
NEUTROPHILS NFR BLD AUTO: 84 % — HIGH (ref 43–77)
NITRITE UR-MCNC: NEGATIVE — SIGNIFICANT CHANGE UP
NRBC # BLD: 0 /100 WBCS — SIGNIFICANT CHANGE UP (ref 0–0)
OSMOLALITY SERPL: 244 MOSMOL/KG — LOW (ref 280–301)
PH UR: 6.5 — SIGNIFICANT CHANGE UP (ref 5–8)
PLATELET # BLD AUTO: 224 K/UL — SIGNIFICANT CHANGE UP (ref 150–400)
POTASSIUM SERPL-MCNC: 3.6 MMOL/L — SIGNIFICANT CHANGE UP (ref 3.5–5.3)
POTASSIUM SERPL-SCNC: 3.6 MMOL/L — SIGNIFICANT CHANGE UP (ref 3.5–5.3)
PROT SERPL-MCNC: 7.6 G/DL — SIGNIFICANT CHANGE UP (ref 6–8.3)
PROT UR-MCNC: ABNORMAL
RBC # BLD: 4.51 M/UL — SIGNIFICANT CHANGE UP (ref 3.8–5.2)
RBC # FLD: 11.8 % — SIGNIFICANT CHANGE UP (ref 10.3–14.5)
SARS-COV-2 RNA SPEC QL NAA+PROBE: SIGNIFICANT CHANGE UP
SODIUM SERPL-SCNC: 115 MMOL/L — CRITICAL LOW (ref 135–145)
SP GR SPEC: 1.02 — SIGNIFICANT CHANGE UP (ref 1.01–1.02)
UROBILINOGEN FLD QL: NEGATIVE — SIGNIFICANT CHANGE UP
WBC # BLD: 9.97 K/UL — SIGNIFICANT CHANGE UP (ref 3.8–10.5)
WBC # FLD AUTO: 9.97 K/UL — SIGNIFICANT CHANGE UP (ref 3.8–10.5)

## 2020-10-23 PROCEDURE — 93010 ELECTROCARDIOGRAM REPORT: CPT

## 2020-10-23 PROCEDURE — 99291 CRITICAL CARE FIRST HOUR: CPT

## 2020-10-23 PROCEDURE — 71045 X-RAY EXAM CHEST 1 VIEW: CPT | Mod: 26

## 2020-10-23 RX ORDER — ANASTROZOLE 1 MG/1
1 TABLET ORAL DAILY
Refills: 0 | Status: DISCONTINUED | OUTPATIENT
Start: 2020-10-23 | End: 2020-10-27

## 2020-10-23 RX ORDER — ONDANSETRON 8 MG/1
4 TABLET, FILM COATED ORAL ONCE
Refills: 0 | Status: COMPLETED | OUTPATIENT
Start: 2020-10-23 | End: 2020-10-23

## 2020-10-23 RX ORDER — ACETAMINOPHEN 500 MG
650 TABLET ORAL EVERY 6 HOURS
Refills: 0 | Status: DISCONTINUED | OUTPATIENT
Start: 2020-10-23 | End: 2020-10-27

## 2020-10-23 RX ORDER — SODIUM CHLORIDE 9 MG/ML
1000 INJECTION, SOLUTION INTRAVENOUS ONCE
Refills: 0 | Status: COMPLETED | OUTPATIENT
Start: 2020-10-23 | End: 2020-10-23

## 2020-10-23 RX ORDER — SODIUM CHLORIDE 5 G/100ML
1000 INJECTION, SOLUTION INTRAVENOUS
Refills: 0 | Status: DISCONTINUED | OUTPATIENT
Start: 2020-10-23 | End: 2020-10-24

## 2020-10-23 RX ORDER — METOCLOPRAMIDE HCL 10 MG
10 TABLET ORAL ONCE
Refills: 0 | Status: COMPLETED | OUTPATIENT
Start: 2020-10-23 | End: 2020-10-23

## 2020-10-23 RX ORDER — ENOXAPARIN SODIUM 100 MG/ML
40 INJECTION SUBCUTANEOUS DAILY
Refills: 0 | Status: DISCONTINUED | OUTPATIENT
Start: 2020-10-23 | End: 2020-10-27

## 2020-10-23 RX ORDER — ASPIRIN/CALCIUM CARB/MAGNESIUM 324 MG
81 TABLET ORAL DAILY
Refills: 0 | Status: DISCONTINUED | OUTPATIENT
Start: 2020-10-23 | End: 2020-10-27

## 2020-10-23 RX ORDER — LOSARTAN POTASSIUM 100 MG/1
50 TABLET, FILM COATED ORAL DAILY
Refills: 0 | Status: DISCONTINUED | OUTPATIENT
Start: 2020-10-23 | End: 2020-10-27

## 2020-10-23 RX ORDER — ATORVASTATIN CALCIUM 80 MG/1
10 TABLET, FILM COATED ORAL AT BEDTIME
Refills: 0 | Status: DISCONTINUED | OUTPATIENT
Start: 2020-10-23 | End: 2020-10-27

## 2020-10-23 RX ADMIN — SODIUM CHLORIDE 1000 MILLILITER(S): 9 INJECTION, SOLUTION INTRAVENOUS at 18:33

## 2020-10-23 RX ADMIN — ATORVASTATIN CALCIUM 10 MILLIGRAM(S): 80 TABLET, FILM COATED ORAL at 23:00

## 2020-10-23 RX ADMIN — ONDANSETRON 4 MILLIGRAM(S): 8 TABLET, FILM COATED ORAL at 20:14

## 2020-10-23 RX ADMIN — LOSARTAN POTASSIUM 50 MILLIGRAM(S): 100 TABLET, FILM COATED ORAL at 23:00

## 2020-10-23 RX ADMIN — SODIUM CHLORIDE 50 MILLILITER(S): 5 INJECTION, SOLUTION INTRAVENOUS at 20:34

## 2020-10-23 RX ADMIN — Medication 81 MILLIGRAM(S): at 23:00

## 2020-10-23 RX ADMIN — ANASTROZOLE 1 MILLIGRAM(S): 1 TABLET ORAL at 23:00

## 2020-10-23 RX ADMIN — Medication 10 MILLIGRAM(S): at 18:33

## 2020-10-23 NOTE — H&P ADULT - NSHPREVIEWOFSYSTEMS_GEN_ALL_CORE
Gen: no night sweats or change in appetite  Eyes: no changes in vision or diplopia   ENT: no epistaxis, sinus pain, gingival bleeding, odynophagia or dysphagia  CV: no CP, PND or palpitations  Resp: no cough, wheezing, or hemoptysis  GI: no hematemesis, hematochezia, or melena  : no dysuria, polyuria, or hematuria  MSK: no arthralgias or joint swelling   Neuro: no gross sensory changes, numbness, focal deficits  Psych: no depression or changes in concentration  Heme/Onc: no purpura, petechiae or night sweats  Skin: no pruritus, hair loss or skin lesions  All: no photosensitivity, no complaints of anaphylaxis (SOB, throat swelling) Gen: no night sweats; +KYM  Eyes: no changes in vision or diplopia   ENT: no epistaxis, sinus pain, gingival bleeding, odynophagia or dysphagia  CV: no CP, PND or palpitations  Resp: no cough, wheezing, or hemoptysis  GI: no hematemesis, hematochezia, or melena  : no dysuria, polyuria, or hematuria  MSK: no arthralgias or joint swelling   Neuro: +headache, +n/v  Psych: no depression or changes in concentration  Heme/Onc: no purpura, petechiae or night sweats  Skin: no pruritus, hair loss or skin lesions  All: no photosensitivity, no complaints of anaphylaxis (SOB, throat swelling)

## 2020-10-23 NOTE — ED PROVIDER NOTE - OBJECTIVE STATEMENT
71 yo F     last week admitted for syncope, had CT/echo/CXR with no findings.  found her lying on the floor, pt had +LOC. admitted 3 days then left.   Wednesday starting having nausea/vomiting + diffuse HA. took tylenol which helped.   yesteerday morning came in to get follow up CT here, no findings. F/U PMD   this morning. today after 2nd dose of anti-nausea pill.     denies fever/chiulls. 69 yo F hx HTN c/o of HA, nausea and vomiting.  She was last week admitted for syncope, had CT/echo/CXR with no findings.  found her lying on the floor, pt had +LOC.  Her  heard a thump and found her on the floor. ECHO and CT were benign. She does not remember why she lost consciousness and she is not sure whether she hit her head. Was admitted for 3 days then left.  Patient has been taking anti-nausea medicine that she does not recall the name of, but she reports minimal improvement from it. Wednesday starting having nausea/vomiting + diffuse HA. Took tylenol which helped with HA.  Yesterday morning came in to get follow up CT here, no findings.  Her doctor suspected that her syncopal episode was due to low blood pressure.  She followed up with PMD Dr. Byrnes this morning who sent her here to ER because has not been able to tolerate fluids for the past week.     Denies fever/chills.

## 2020-10-23 NOTE — ED PROVIDER NOTE - ATTENDING CONTRIBUTION TO CARE
Attending MD Foote:  I personally have seen and examined this patient.  Resident note reviewed and agree on plan of care and except where noted.  See HPI, PE, and MDM for details.

## 2020-10-23 NOTE — ED ADULT NURSE NOTE - OBJECTIVE STATEMENT
Patient presents to Ed with c/o headache. Patient reports passing out last week and was in the hospital for   3 days and discharged. Patient report she has been experiencing nausea vomiting and headaches since  Wednesday, Patient denies chest pain sob cough fever chills or dizziness. RAC 20g iv lock placed, labs   drawn and sent.

## 2020-10-23 NOTE — ED PROVIDER NOTE - PHYSICAL EXAMINATION
Gen: WDWN, NAD  HEENT: EOMI, no nasal discharge, mucous membranes moist  CV: RRR, +S1/S2, no M/R/G  Resp: CTAB, no W/R/R  GI: Abdomen soft non-distended, NTTP, no masses  MSK: No open wounds, no bruising, no lower extremity edema  Neuro: A&Ox4, following commands, moving all four extremities spontaneously  Psych: appropriate mood, denies AH, VH, SI

## 2020-10-23 NOTE — ED PROVIDER NOTE - CLINICAL SUMMARY MEDICAL DECISION MAKING FREE TEXT BOX
Attending MD Foote:  70F presenting with persist nausea/vomiting and diffuse intermittent headache pain, seen by me 1 day prior for same with negative CT head dx of post-concussive syndrome as she had recent syncope and head injury. Nonfocal neuro exam currently, plan for screening labs, anti-emetics and reassessment       *The above represents an initial assessment/impression. Please refer to progress notes for potential changes in patient clinical course*

## 2020-10-23 NOTE — H&P ADULT - ASSESSMENT
70F with PMHx of breast cancer, hypertension, ductal carcinoma of breast, and moderate carotid stenosis presents for two day history of nausea, vomiting, and decrease PO intake and found to be severely hyponatremic. Patient likely suffering from symptomatic hyponatremia with CT head one day prior to admission being unremarkable.    #Hypotonic Hyponatremia - suspect SIADH vs. renal losses. She takes HCTZ & looks euvolemic/slightly hypovolemic. She endorses decrease in urinary output for past day attributed to poor PO intake.  -Serum Osm 244, need to obtain UA, spot urine sodium, and urine osmolality  -Started on hypertonic saline 2% NS, will trend BMP q4 hours  -If sodium does not improve will consult MICU for 3% NS in monitored setting  -Renal consult  -Patient told to stop taking HCTZ indefinitely  -Neuro checks    #HTN  -Continue with Losartan & hold HCTZ    #Left Breast Ductal Carcinoma s/p radiation and lumpectomy  -Continue with Anastrazole  -Outpatient follow up with Dr. Stewart     #Left Internal Carotid Moderate Stenosis  -Continue with ASA & Statin    PT consult 70F with PMHx of breast cancer, hypertension, ductal carcinoma of breast, and moderate carotid stenosis presents for two day history of nausea, vomiting, and decrease PO intake and found to be severely hyponatremic. Patient likely suffering from symptomatic hyponatremia with CT head one day prior to admission being unremarkable.    #Hypotonic Hyponatremia - suspect SIADH vs. renal losses. She takes HCTZ & looks euvolemic/slightly hypovolemic. She endorses decrease in urinary output for past day attributed to poor PO intake.  -Serum Osm 244, need to obtain UA, spot urine sodium, and urine osmolality  -Started on hypertonic saline 2% NS, will trend BMP q4 hours  -If sodium does not improve will consult MICU for 3% NS in monitored setting  -Renal consult  -Patient told to stop taking HCTZ indefinitely  -Neuro checks  -Aim to correct sodium by 10 mEQ every 24 hours to avoid osmotic demyelination syndrome, though if hyponatremia has been going on for =<48 hours then the risk of this happening is low    #HTN  -Continue with Losartan & hold HCTZ    #Left Breast Ductal Carcinoma s/p radiation and lumpectomy  -Continue with Anastrazole  -Outpatient follow up with Dr. Stewart     #Left Internal Carotid Moderate Stenosis  -Continue with ASA & Statin    PT consult

## 2020-10-23 NOTE — H&P ADULT - ATTENDING COMMENTS
I personally provided 40 minutes of critical care for the patient. Patient is suffering from severe symptomatic hyponatremia requiring hypertonic saline.

## 2020-10-23 NOTE — H&P ADULT - NSICDXPASTSURGICALHX_GEN_ALL_CORE_FT
PAST SURGICAL HISTORY:  No significant past surgical history      PAST SURGICAL HISTORY:  History of lumpectomy

## 2020-10-23 NOTE — H&P ADULT - NSHPPHYSICALEXAM_GEN_ALL_CORE
T(C): 36.9 (10-23-20 @ 16:35), Max: 36.9 (10-23-20 @ 15:17)  HR: 83 (10-23-20 @ 16:35) (83 - 86)  BP: 155/82 (10-23-20 @ 16:35) (150/90 - 167/78)  RR: 18 (10-23-20 @ 16:35) (18 - 18)  SpO2: 99% (10-23-20 @ 16:35) (95% - 100%)    Gen: (fe)male in NAD, appears comfortable, no diaphoresis  HEENT: NCAT, MMM, neck soft and supple  CV: +S1/S2, no m/r/g  Resp: CTAB, no w/r/r  GI: normoactive BS, soft, NTND, no rebounding/guarding  Ext: No LE edema, extremities appear warm and well perfused   Neuro: AOx3, no focal deficits, CNII-XII grossly intact  Psych: No SI/HI/AVH, appropriate affect  Skin: no petechiae, ecchymosis or maculopapular rash noted T(C): 36.9 (10-23-20 @ 16:35), Max: 36.9 (10-23-20 @ 15:17)  HR: 83 (10-23-20 @ 16:35) (83 - 86)  BP: 155/82 (10-23-20 @ 16:35) (150/90 - 167/78)  RR: 18 (10-23-20 @ 16:35) (18 - 18)  SpO2: 99% (10-23-20 @ 16:35) (95% - 100%)    Gen: female in NAD, appears comfortable, no diaphoresis  HEENT: NCAT, MMM, neck soft and supple, EOMI, no nystagmus  CV: +S1/S2, no m/r/g  Resp: CTAB, no w/r/r  GI: normoactive BS, soft, NTND, no rebounding/guarding  Ext: No LE edema, extremities appear warm and well perfused   Neuro: AOx3, no focal deficits, CNII-XII grossly intact  Psych: No SI/HI/AVH, appropriate affect  Skin: no petechiae, ecchymosis or maculopapular rash noted

## 2020-10-23 NOTE — ED ADULT NURSE NOTE - NSIMPLEMENTINTERV_GEN_ALL_ED
Implemented All Fall Risk Interventions:  [place] to call system. Call bell, personal items and telephone within reach. Instruct patient to call for assistance. Room bathroom lighting operational. Non-slip footwear when patient is off stretcher. Physically safe environment: no spills, clutter or unnecessary equipment. Stretcher in lowest position, wheels locked, appropriate side rails in place. Provide visual cue, wrist band, yellow gown, etc. Monitor gait and stability. Monitor for mental status changes and reorient to person, place, and time. Review medications for side effects contributing to fall risk. Reinforce activity limits and safety measures with patient and family.

## 2020-10-23 NOTE — H&P ADULT - HISTORY OF PRESENT ILLNESS
70F with PMHx of breast cancer, hypertension, ductal carcinoma of breast, and moderate carotid stenosis presents for two day history of nausea, vomiting, and decrease PO intake. One week prior patient had a syncopal event with no prodromal syndromes. She had unremarkable CTH, CTA Chest, TTE, and found to have moderate internal carotid stenosis of left and started on aspirin and statin. Since then she has been feeling unwell. She has had loss of appetite because she feels nauseous each time she eats. No syncopal episodes since. States her BP has been usually elevated. She thought it was because of hypertension so she took 25 mg of HCTZ yesterday. She presented to the ED yesterday twice and had an unremarkable CT head. No blood work was done. Patient denies over the counter supplements. She does not partake in ectasy. No blood work drawn in past several days. Today patient given 1 L LR and found to have Na of 110 on VBG and 115 on BMP. Patient started on 2% NS at 50 cc/hr. When seen by me in ED she has no complaints. She states her urinary output as decreased which she thinks is because of lack of appetite.

## 2020-10-24 DIAGNOSIS — E87.1 HYPO-OSMOLALITY AND HYPONATREMIA: ICD-10-CM

## 2020-10-24 LAB
ANION GAP SERPL CALC-SCNC: 10 MMOL/L — SIGNIFICANT CHANGE UP (ref 5–17)
ANION GAP SERPL CALC-SCNC: 11 MMOL/L — SIGNIFICANT CHANGE UP (ref 5–17)
ANION GAP SERPL CALC-SCNC: 13 MMOL/L — SIGNIFICANT CHANGE UP (ref 5–17)
BUN SERPL-MCNC: 8 MG/DL — SIGNIFICANT CHANGE UP (ref 7–23)
BUN SERPL-MCNC: 8 MG/DL — SIGNIFICANT CHANGE UP (ref 7–23)
BUN SERPL-MCNC: 9 MG/DL — SIGNIFICANT CHANGE UP (ref 7–23)
CALCIUM SERPL-MCNC: 8.2 MG/DL — LOW (ref 8.4–10.5)
CALCIUM SERPL-MCNC: 8.6 MG/DL — SIGNIFICANT CHANGE UP (ref 8.4–10.5)
CALCIUM SERPL-MCNC: 8.7 MG/DL — SIGNIFICANT CHANGE UP (ref 8.4–10.5)
CHLORIDE SERPL-SCNC: 83 MMOL/L — LOW (ref 96–108)
CHLORIDE SERPL-SCNC: 89 MMOL/L — LOW (ref 96–108)
CHLORIDE SERPL-SCNC: 91 MMOL/L — LOW (ref 96–108)
CO2 SERPL-SCNC: 21 MMOL/L — LOW (ref 22–31)
CO2 SERPL-SCNC: 22 MMOL/L — SIGNIFICANT CHANGE UP (ref 22–31)
CO2 SERPL-SCNC: 23 MMOL/L — SIGNIFICANT CHANGE UP (ref 22–31)
CREAT SERPL-MCNC: 0.46 MG/DL — LOW (ref 0.5–1.3)
CREAT SERPL-MCNC: 0.48 MG/DL — LOW (ref 0.5–1.3)
CREAT SERPL-MCNC: 0.5 MG/DL — SIGNIFICANT CHANGE UP (ref 0.5–1.3)
GLUCOSE SERPL-MCNC: 79 MG/DL — SIGNIFICANT CHANGE UP (ref 70–99)
GLUCOSE SERPL-MCNC: 84 MG/DL — SIGNIFICANT CHANGE UP (ref 70–99)
GLUCOSE SERPL-MCNC: 85 MG/DL — SIGNIFICANT CHANGE UP (ref 70–99)
HCT VFR BLD CALC: 30.9 % — LOW (ref 34.5–45)
HGB BLD-MCNC: 11 G/DL — LOW (ref 11.5–15.5)
MCHC RBC-ENTMCNC: 27.6 PG — SIGNIFICANT CHANGE UP (ref 27–34)
MCHC RBC-ENTMCNC: 35.6 GM/DL — SIGNIFICANT CHANGE UP (ref 32–36)
MCV RBC AUTO: 77.4 FL — LOW (ref 80–100)
NRBC # BLD: 0 /100 WBCS — SIGNIFICANT CHANGE UP (ref 0–0)
OSMOLALITY UR: 322 MOS/KG — SIGNIFICANT CHANGE UP (ref 300–900)
PLATELET # BLD AUTO: 189 K/UL — SIGNIFICANT CHANGE UP (ref 150–400)
POTASSIUM SERPL-MCNC: 3.2 MMOL/L — LOW (ref 3.5–5.3)
POTASSIUM SERPL-MCNC: 3.6 MMOL/L — SIGNIFICANT CHANGE UP (ref 3.5–5.3)
POTASSIUM SERPL-MCNC: 3.9 MMOL/L — SIGNIFICANT CHANGE UP (ref 3.5–5.3)
POTASSIUM SERPL-SCNC: 3.2 MMOL/L — LOW (ref 3.5–5.3)
POTASSIUM SERPL-SCNC: 3.6 MMOL/L — SIGNIFICANT CHANGE UP (ref 3.5–5.3)
POTASSIUM SERPL-SCNC: 3.9 MMOL/L — SIGNIFICANT CHANGE UP (ref 3.5–5.3)
RBC # BLD: 3.99 M/UL — SIGNIFICANT CHANGE UP (ref 3.8–5.2)
RBC # FLD: 11.7 % — SIGNIFICANT CHANGE UP (ref 10.3–14.5)
SODIUM SERPL-SCNC: 118 MMOL/L — CRITICAL LOW (ref 135–145)
SODIUM SERPL-SCNC: 122 MMOL/L — LOW (ref 135–145)
SODIUM SERPL-SCNC: 123 MMOL/L — LOW (ref 135–145)
SODIUM UR-SCNC: 35 MMOL/L — SIGNIFICANT CHANGE UP
TSH SERPL-MCNC: 3.38 UIU/ML — SIGNIFICANT CHANGE UP (ref 0.27–4.2)
WBC # BLD: 7.1 K/UL — SIGNIFICANT CHANGE UP (ref 3.8–10.5)
WBC # FLD AUTO: 7.1 K/UL — SIGNIFICANT CHANGE UP (ref 3.8–10.5)

## 2020-10-24 PROCEDURE — 99223 1ST HOSP IP/OBS HIGH 75: CPT | Mod: GC

## 2020-10-24 RX ORDER — POTASSIUM CHLORIDE 20 MEQ
40 PACKET (EA) ORAL ONCE
Refills: 0 | Status: COMPLETED | OUTPATIENT
Start: 2020-10-24 | End: 2020-10-24

## 2020-10-24 RX ADMIN — ENOXAPARIN SODIUM 40 MILLIGRAM(S): 100 INJECTION SUBCUTANEOUS at 11:57

## 2020-10-24 RX ADMIN — Medication 40 MILLIEQUIVALENT(S): at 03:54

## 2020-10-24 RX ADMIN — Medication 81 MILLIGRAM(S): at 11:57

## 2020-10-24 RX ADMIN — SODIUM CHLORIDE 35 MILLILITER(S): 5 INJECTION, SOLUTION INTRAVENOUS at 05:08

## 2020-10-24 RX ADMIN — ANASTROZOLE 1 MILLIGRAM(S): 1 TABLET ORAL at 11:57

## 2020-10-24 RX ADMIN — LOSARTAN POTASSIUM 50 MILLIGRAM(S): 100 TABLET, FILM COATED ORAL at 21:04

## 2020-10-24 RX ADMIN — SODIUM CHLORIDE 50 MILLILITER(S): 5 INJECTION, SOLUTION INTRAVENOUS at 01:11

## 2020-10-24 RX ADMIN — Medication 650 MILLIGRAM(S): at 01:23

## 2020-10-24 RX ADMIN — ATORVASTATIN CALCIUM 10 MILLIGRAM(S): 80 TABLET, FILM COATED ORAL at 21:04

## 2020-10-24 RX ADMIN — Medication 650 MILLIGRAM(S): at 01:53

## 2020-10-24 RX ADMIN — Medication 40 MILLIEQUIVALENT(S): at 13:41

## 2020-10-24 NOTE — CONSULT NOTE ADULT - ASSESSMENT
70F PMHx breast cancer, HTN who present to ED after fall at home - admitted for hyponatremia.  Nephrology consulted for hyponatremia.       # Hyponatremia (asymptomatic) likely multifactorial including thiazide and SIADH in setting of pain (concussion), nausea, vomiting, stress  On admission sNa 115, sOsm 244, UA specific gravity 1.025, started on hypertonic saline 2%, last sNa 118    Recommendations:  - agree w/ hypertonic saline 2% at 35 cc/hr for now, please monitor BMP q4hr as thiazide/SIADH effect might wean off risking rapid overcorrection  - HOLD thiazide and add to allergy list (cause hypoNa)  - monitor BMP every 4 hours for now, can stop HS 2% once sNa >125  - avoid rapid correction of serum Na, correction goal 6-8 mEq/24hrs (high risk of osmotic demyelination syndrome given age and diuretic)   - control nausea and headache if recurrent (currently not present)  please call nephrology fellow for update serum Na   GOAL sNa 121-123 mEq by 7PM 10/24     70F PMHx breast cancer, HTN who present to ED after fall at home - admitted for hyponatremia.  Nephrology consulted for hyponatremia.       # Hyponatremia (asymptomatic) likely multifactorial including thiazide and SIADH in setting of pain (concussion), nausea, vomiting, stress  On admission sNa 115, sOsm 244, UA specific gravity 1.025, started on hypertonic saline 2%, last sNa 118    Recommendations:  - agree w/ hypertonic saline 2% at 35 cc/hr for now, please monitor BMP q4hr as thiazide/SIADH effect might wean off risking rapid overcorrection  - HOLD thiazide and add to allergy list (cause hypoNa)  - replete hypokalemia (goal >4), will help improve serum Na level  - monitor BMP every 4 hours for now, can stop HS 2% once sNa >125.  check urine osmolality q12hr  - avoid rapid correction of serum Na, correction goal 6-8 mEq/24hrs (high risk of osmotic demyelination syndrome given age and diuretic)   - control nausea and headache if recurrent (currently not present)  please call nephrology fellow for update serum Na   GOAL sNa 121-123 mEq by 7PM 10/24

## 2020-10-24 NOTE — PROVIDER CONTACT NOTE (CRITICAL VALUE NOTIFICATION) - BACKGROUND
Patient admitted with decrease PO intake, syncopal episode Patient admitted with decrease PO intake, N/V

## 2020-10-24 NOTE — PROGRESS NOTE ADULT - SUBJECTIVE AND OBJECTIVE BOX
no  cp/sob    REVIEW OF SYSTEMS:  GEN: no fever,    no chills  RESP: no SOB,   no cough  CVS: no chest pain,   no palpitations  GI: no abdominal pain,   no nausea,   no vomiting,   no constipation,   no diarrhea  : no dysuria,   no frequency  NEURO: no headache,   no dizziness  PSYCH: no depression,   not anxious  Derm : no rash    MEDICATIONS  (STANDING):  anastrozole 1 milliGRAM(s) Oral daily  aspirin  chewable 81 milliGRAM(s) Oral daily  atorvastatin 10 milliGRAM(s) Oral at bedtime  enoxaparin Injectable 40 milliGRAM(s) SubCutaneous daily  losartan 50 milliGRAM(s) Oral daily  sodium chloride 2% . 1000 milliLiter(s) (35 mL/Hr) IV Continuous <Continuous>    MEDICATIONS  (PRN):  acetaminophen   Tablet .. 650 milliGRAM(s) Oral every 6 hours PRN Temp greater or equal to 38C (100.4F), Mild Pain (1 - 3), Moderate Pain (4 - 6), Severe Pain (7 - 10)      Vital Signs Last 24 Hrs  T(C): 36.9 (24 Oct 2020 06:00), Max: 37.1 (23 Oct 2020 22:18)  T(F): 98.4 (24 Oct 2020 06:00), Max: 98.7 (23 Oct 2020 22:18)  HR: 71 (24 Oct 2020 06:00) (71 - 86)  BP: 153/80 (24 Oct 2020 06:00) (147/76 - 167/78)  BP(mean): --  RR: 16 (24 Oct 2020 06:00) (16 - 18)  SpO2: 98% (24 Oct 2020 06:00) (95% - 99%)  CAPILLARY BLOOD GLUCOSE        I&O's Summary    23 Oct 2020 07:01  -  24 Oct 2020 07:00  --------------------------------------------------------  IN: 360 mL / OUT: 0 mL / NET: 360 mL        PHYSICAL EXAM:  HEAD:  Atraumatic, Normocephalic  NECK: Supple, No   JVD  CHEST/LUNG:   no     rales,     no,    rhonchi  HEART: Regular rate and rhythm;         murmur  ABDOMEN: Soft, Nontender, ;   EXTREMITIES:    no    edema  NEUROLOGY:  alert    LABS:                        11.0   7.10  )-----------( 189      ( 24 Oct 2020 02:41 )             30.9     10-24    118<LL>  |  83<L>  |  9   ----------------------------<  79  3.2<L>   |  22  |  0.48<L>    Ca    8.2<L>      24 Oct 2020 02:41    TPro  7.6  /  Alb  4.2  /  TBili  0.6  /  DBili  x   /  AST  37  /  ALT  28  /  AlkPhos  66  10-23          Urinalysis Basic - ( 23 Oct 2020 22:42 )    Color: Yellow / Appearance: Clear / S.025 / pH: x  Gluc: x / Ketone: Large  / Bili: Negative / Urobili: Negative   Blood: x / Protein: Trace / Nitrite: Negative   Leuk Esterase: Negative / RBC: 4 /hpf / WBC 5 /HPF   Sq Epi: x / Non Sq Epi: 1 /hpf / Bacteria: Negative          10-23 @ 19:03  3.5  54      Thyroid Stimulating Hormone, Serum: 3.38 uIU/mL (10-24 @ 03:30)          Consultant(s) Notes Reviewed:      Care Discussed with Consultants/Other Providers:

## 2020-10-24 NOTE — PROVIDER CONTACT NOTE (OTHER) - ACTION/TREATMENT ORDERED:
no intervention at this time
PA made aware and spoke to renal MD who recommended waiting for results for midnight BMP and according to results Will decide If extra NS fluid is necessary.

## 2020-10-24 NOTE — PROVIDER CONTACT NOTE (CRITICAL VALUE NOTIFICATION) - ASSESSMENT
Patient laying in bed receiving 2% Sodium Chloride at 50 ml/hr, no SOB, no numbness or tingling, no SOB, no chest pain Patient laying in bed receiving 2% Sodium Chloride at 50 ml/hr, no SOB, no numbness or tingling, no SOB, no chest pain, no n/v

## 2020-10-24 NOTE — PHYSICAL THERAPY INITIAL EVALUATION ADULT - ADDITIONAL COMMENTS
lives w/ lives w/ and son in condo, no steps to enter but 6+7 inside w/ handrails, uses glasses or contacts, hearing good, R handed

## 2020-10-24 NOTE — CONSULT NOTE ADULT - SUBJECTIVE AND OBJECTIVE BOX
[facility] DIVISION OF KIDNEY DISEASES AND HYPERTENSION   -- INITIAL CONSULT NOTE --  Yoko Landaverde, Nephrology Fellow     [place] Pager: [phone] / [place] Pager: [phone]  After 5pm or on weekend, please page the on-call fellow)  --------------------------------------------------------------------------------    HPI:  70F PMHx breast cancer, HTN who present to ED after fall at home.  Of note patient recently hospitalized in [facility] in Oct 2020 for syncope attributable to orthostatic hypotension, losartan dose decreased and stopped thiazide w/ echocardiogram showing EF 59% relatively normal.  This time patient report she fall at home after feeling very nausea, vomiting, headache, stress due to son having baseball accident (fracture).      Nephrology consulted for hyponatremia.  Upon review of [facility]/[place], baseline serum Na 140 (last sNa 140 in 10/19/20).  On admission serum sodium 115, serum osm 244, Urinalysis showed specific gravity 1.025, no UTI markers.  Pt started on hypertonic saline 2% w/ repeat sNa 118.   Pt report after being discharge from hospital on 10/19 she called her PCP regarding change to BP medications (decrease losartan 100 to 50mg and stopped thiazide) who recommend restarting thiazide 25mg daily.  She report she been taking for very long time many years.  No there change to medications.      118  |  83  |  9   ----------------------------<  79      [10-24-20 @ 02:41]  3.2   |  22  |  0.48        Ca     8.2     [10-24-20 @ 02:41]    TPro  7.6  /  Alb  4.2  /  TBili  0.6  /  DBili  x   /  AST  37  /  ALT  28  /  AlkPhos  66  [10-23-20 @ 19:03]    Serum Osmolality 244      [10-23-20 @ 19:45]    Creatinine Trend:  SCr 0.48 [10-24 @ 02:41]  SCr 0.48 [10-23 @ 19:03]  SCr 0.53 [10-19 @ 06:00]  SCr 0.54 [10-18 @ 07:18]  SCr 0.67 [10-17 @ 01:31]    Urinalysis - [10-23-20 @ 22:42]      Color Yellow / Appearance Clear / SG 1.025 / pH 6.5      Gluc Negative / Ketone Large  / Bili Negative / Urobili Negative       Blood Negative / Protein Trace / Leuk Est Negative / Nitrite Negative      RBC 4 / WBC 5 / Hyaline 0 / Gran  / Sq Epi  / Non Sq Epi 1 / Bacteria Negative    TSH 3.38      [10-24-20 @ 03:30]  Lipid: chol 207, TG 93, HDL 91, LDL 98      [10-18-20 @ 11:51]    HCV 0.10, Nonreact      [10-18-20 @ 09:06]        PAST HISTORY  --------------------------------------------------------------------------------  PAST MEDICAL & SURGICAL HISTORY:  Breast cancer  Osteoporosis  Hypertension  History of lumpectomy    FAMILY HISTORY:  FH: chronic kidney disease      PAST SOCIAL HISTORY:  ALLERGIES & MEDICATIONS  --------------------------------------------------------------------------------  Allergies    Cipro (Rash)  penicillin (Rash)    Intolerances    Standing Inpatient Medications  anastrozole 1 milliGRAM(s) Oral daily  aspirin  chewable 81 milliGRAM(s) Oral daily  atorvastatin 10 milliGRAM(s) Oral at bedtime  enoxaparin Injectable 40 milliGRAM(s) SubCutaneous daily  losartan 50 milliGRAM(s) Oral daily  sodium chloride 2% . 1000 milliLiter(s) IV Continuous <Continuous>    PRN Inpatient Medications  acetaminophen   Tablet .. 650 milliGRAM(s) Oral every 6 hours PRN    REVIEW OF SYSTEMS  Gen: no fever, chills, weakness  Respiratory: No dyspnea, cough  CV: No chest pain, orthopnea  GI: No abdominal pain, nausea, vomiting, diarrhea  MSK: no edema  Neuro: No dizziness, lightheadedness  All other systems were reviewed and are negative, except as noted.    VITALS/PHYSICAL EXAM  --------------------------------------------------------------------------------  T(C): 36.7 (10-24-20 @ 09:36), Max: 37.1 (10-23-20 @ 22:18)  HR: 87 (10-24-20 @ 09:36) (71 - 87)  BP: 158/87 (10-24-20 @ 09:36) (147/76 - 167/78)  RR: 18 (10-24-20 @ 09:36) (16 - 18)  SpO2: 97% (10-24-20 @ 09:36) (95% - 99%)  Wt(kg): --  Height (cm): 165.1 (10-23-20 @ 15:17)  Weight (kg): 56.2 (10-23-20 @ 15:17)  BMI (kg/m2): 20.6 (10-23-20 @ 15:17)  BSA (m2): 1.61 (10-23-20 @ 15:17)    10-23-20 @ 07:01  -  10-24-20 @ 07:00  --------------------------------------------------------  IN: 360 mL / OUT: 0 mL / NET: 360 mL    10-24-20 @ 07:01  -  10-24-20 @ 11:50  --------------------------------------------------------  IN: 240 mL / OUT: 0 mL / NET: 240 mL    Physical Exam:              Gen: NAD, well-appearing on room air  	HEENT: moist mucous membrane  	Pulm: CTA B/L, no crackles  	CV: RRR, S1S2; no rub/murmur  	GI: +BS, soft, nontender/nondistended  	: No suprapubic tenderness  	MSK: Warm, no edema              Neuro: AAOx3  	Psych: Normal affect and mood  	Skin: Warm  LABS/STUDIES  --------------------------------------------------------------------------------                11.0   7.10  >-----------<  189      [10-24-20 @ 02:41]              30.9     118  |  83  |  9   ----------------------------<  79      [10-24-20 @ 02:41]  3.2   |  22  |  0.48        Ca     8.2     [10-24-20 @ 02:41]    TPro  7.6  /  Alb  4.2  /  TBili  0.6  /  DBili  x   /  AST  37  /  ALT  28  /  AlkPhos  66  [10-23-20 @ 19:03]    Serum Osmolality 244      [10-23-20 @ 19:45]    Creatinine Trend:  SCr 0.48 [10-24 @ 02:41]  SCr 0.48 [10-23 @ 19:03]  SCr 0.53 [10-19 @ 06:00]  SCr 0.54 [10-18 @ 07:18]  SCr 0.67 [10-17 @ 01:31]    Urinalysis - [10-23-20 @ 22:42]      Color Yellow / Appearance Clear / SG 1.025 / pH 6.5      Gluc Negative / Ketone Large  / Bili Negative / Urobili Negative       Blood Negative / Protein Trace / Leuk Est Negative / Nitrite Negative      RBC 4 / WBC 5 / Hyaline 0 / Gran  / Sq Epi  / Non Sq Epi 1 / Bacteria Negative    TSH 3.38      [10-24-20 @ 03:30]  Lipid: chol 207, TG 93, HDL 91, LDL 98      [10-18-20 @ 11:51]    HCV 0.10, Nonreact      [10-18-20 @ 09:06]     [facility] DIVISION OF KIDNEY DISEASES AND HYPERTENSION   -- INITIAL CONSULT NOTE --  Yoko Landaverde, Nephrology Fellow     NS Pager: [phone] / [place] Pager: [phone]  After 5pm or on weekend, please page the on-call fellow)  --------------------------------------------------------------------------------    HPI:  70F PMHx breast cancer, HTN who present to ED after fall at home.  Of note patient recently hospitalized in [facility] in Oct 2020 for syncope attributable to orthostatic hypotension, losartan dose decreased and stopped thiazide w/ echocardiogram showing EF 59% relatively normal.  This time patient report she fall at home after feeling very nausea, vomiting, headache, stress due to son having baseball accident (fracture).      Nephrology consulted for hyponatremia.  Upon review of [facility]/[place], baseline serum Na 140 (last sNa 140 in 10/19/20).  On admission serum sodium 115, serum osm 244, Urinalysis showed specific gravity 1.025, no UTI markers.  Pt started on hypertonic saline 2% w/ repeat sNa 118.   Pt report after being discharge from hospital on 10/19 she called her PCP regarding change to BP medications (decrease losartan 100 to 50mg and stopped thiazide) who recommend restarting thiazide 25mg daily.  She report she been taking for very long time many years.  No there change to medications.      118  |  83  |  9   ----------------------------<  79      [10-24-20 @ 02:41]  3.2   |  22  |  0.48        Ca     8.2     [10-24-20 @ 02:41]    TPro  7.6  /  Alb  4.2  /  TBili  0.6  /  DBili  x   /  AST  37  /  ALT  28  /  AlkPhos  66  [10-23-20 @ 19:03]    Serum Osmolality 244      [10-23-20 @ 19:45]    Creatinine Trend:  SCr 0.48 [10-24 @ 02:41]  SCr 0.48 [10-23 @ 19:03]  SCr 0.53 [10-19 @ 06:00]  SCr 0.54 [10-18 @ 07:18]  SCr 0.67 [10-17 @ 01:31]    Urinalysis - [10-23-20 @ 22:42]      Color Yellow / Appearance Clear / SG 1.025 / pH 6.5      Gluc Negative / Ketone Large  / Bili Negative / Urobili Negative       Blood Negative / Protein Trace / Leuk Est Negative / Nitrite Negative      RBC 4 / WBC 5 / Hyaline 0 / Gran  / Sq Epi  / Non Sq Epi 1 / Bacteria Negative    TSH 3.38      [10-24-20 @ 03:30]  Lipid: chol 207, TG 93, HDL 91, LDL 98      [10-18-20 @ 11:51]    HCV 0.10, Nonreact      [10-18-20 @ 09:06]      PAST HISTORY  --------------------------------------------------------------------------------  PAST MEDICAL & SURGICAL HISTORY:  Breast cancer  Osteoporosis  Hypertension  History of lumpectomy    FAMILY HISTORY:  FH: chronic kidney disease      PAST SOCIAL HISTORY:  ALLERGIES & MEDICATIONS  --------------------------------------------------------------------------------  Allergies    Cipro (Rash)  penicillin (Rash)    Intolerances    Standing Inpatient Medications  anastrozole 1 milliGRAM(s) Oral daily  aspirin  chewable 81 milliGRAM(s) Oral daily  atorvastatin 10 milliGRAM(s) Oral at bedtime  enoxaparin Injectable 40 milliGRAM(s) SubCutaneous daily  losartan 50 milliGRAM(s) Oral daily  sodium chloride 2% . 1000 milliLiter(s) IV Continuous <Continuous>    PRN Inpatient Medications  acetaminophen   Tablet .. 650 milliGRAM(s) Oral every 6 hours PRN    REVIEW OF SYSTEMS  Gen: no fever, chills, weakness  Respiratory: No dyspnea, cough  CV: No chest pain, orthopnea  GI: No abdominal pain, nausea, vomiting, diarrhea  MSK: no edema  Neuro: No dizziness, lightheadedness  All other systems were reviewed and are negative, except as noted.    VITALS/PHYSICAL EXAM  --------------------------------------------------------------------------------  T(C): 36.7 (10-24-20 @ 09:36), Max: 37.1 (10-23-20 @ 22:18)  HR: 87 (10-24-20 @ 09:36) (71 - 87)  BP: 158/87 (10-24-20 @ 09:36) (147/76 - 167/78)  RR: 18 (10-24-20 @ 09:36) (16 - 18)  SpO2: 97% (10-24-20 @ 09:36) (95% - 99%)  Wt(kg): --  Height (cm): 165.1 (10-23-20 @ 15:17)  Weight (kg): 56.2 (10-23-20 @ 15:17)  BMI (kg/m2): 20.6 (10-23-20 @ 15:17)  BSA (m2): 1.61 (10-23-20 @ 15:17)    10-23-20 @ 07:01  -  10-24-20 @ 07:00  --------------------------------------------------------  IN: 360 mL / OUT: 0 mL / NET: 360 mL    10-24-20 @ 07:01  -  10-24-20 @ 11:50  --------------------------------------------------------  IN: 240 mL / OUT: 0 mL / NET: 240 mL    Physical Exam:              Gen: NAD, well-appearing on room air  	HEENT: moist mucous membrane  	Pulm: CTA B/L, no crackles  	CV: RRR, S1S2; no rub/murmur  	GI: +BS, soft, nontender/nondistended  	: No suprapubic tenderness  	MSK: Warm, no edema              Neuro: AAOx3  	Psych: Normal affect and mood  	Skin: Warm    LABS/STUDIES  --------------------------------------------------------------------------------                11.0   7.10  >-----------<  189      [10-24-20 @ 02:41]              30.9     118  |  83  |  9   ----------------------------<  79      [10-24-20 @ 02:41]  3.2   |  22  |  0.48        Ca     8.2     [10-24-20 @ 02:41]    TPro  7.6  /  Alb  4.2  /  TBili  0.6  /  DBili  x   /  AST  37  /  ALT  28  /  AlkPhos  66  [10-23-20 @ 19:03]    Serum Osmolality 244      [10-23-20 @ 19:45]    Creatinine Trend:  SCr 0.48 [10-24 @ 02:41]  SCr 0.48 [10-23 @ 19:03]  SCr 0.53 [10-19 @ 06:00]  SCr 0.54 [10-18 @ 07:18]  SCr 0.67 [10-17 @ 01:31]    Urinalysis - [10-23-20 @ 22:42]      Color Yellow / Appearance Clear / SG 1.025 / pH 6.5      Gluc Negative / Ketone Large  / Bili Negative / Urobili Negative       Blood Negative / Protein Trace / Leuk Est Negative / Nitrite Negative      RBC 4 / WBC 5 / Hyaline 0 / Gran  / Sq Epi  / Non Sq Epi 1 / Bacteria Negative    TSH 3.38      [10-24-20 @ 03:30]  Lipid: chol 207, TG 93, HDL 91, LDL 98      [10-18-20 @ 11:51]    HCV 0.10, Nonreact      [10-18-20 @ 09:06]

## 2020-10-24 NOTE — PROGRESS NOTE ADULT - ASSESSMENT
70 female,   Hx: HTN ,  Ca  breast  2018,  s/p  lumpectomy/ path, invasive  ductal  cancer, on anastrazole    s/p   multiple   syncopal episodes, from orthostasis/ then improved   Ct   angio on 10/17/20, no  PE/  mlple small  nodules/ GOO, left upper lung/  f/p ct chest, 2  months. [place], [facility]  echo,    normal  Ef  carotid  dopplers   with   69 %  stenosis, Left ica/  discussed  with  neuro, no  intervention/ on asa/ lipitor.  prt  to  f/p  with neuro     now  admitted with dehydration/ hyponatremia  and  pt restarted  her  diuretic  hyponatremia  of  115, on arrival, is  118  today/ with low  chloride/  Sodium was  140  on 10/19/20   follow bmp/ renal eval  pt instructed   again, to stop  her diuretics   and  to  have  adequate  fluid intake  known to  card/HTN, on cozaar  on  dvt ppx       ec< from: CT Angio Chest w/ IV Cont (10.17.20 @ 02:45) >  IMPRESSION:  No evidence of main, lobar, or proximal segmental pulmonary artery embolism. Limited evaluation the distal segmental and subsegmental pulmonary arteries.  Small focal subpleural groundglass opacity in the left upper lobe measuring 1 cm, nonspecific, potentially infectious or inflammatory, although a follow-up chest CT in 6 months should be performed to document resolution or stability.    Several scattered solid lung nodules measuring under 6 mm, nonspecific. In a low risk patient, no routine follow-up is needed. In a high-risk patient, consider follow-up chest CT in 12 months.  < end of copied text >       rd< from: CT Head No Cont (10.17.20 @ 01:47) >  MPRESSION:  No acute intracranial hemorrhage, large territorial infarct, or mass effect.    < end of copied text >   70 female,   Hx: HTN ,  Ca  breast  2018,  s/p  lumpectomy/ path, invasive  ductal  cancer, on anastrazole    s/p   multiple   syncopal episodes, from orthostasis/ then improved   Ct   angio on 10/17/20, no  PE/  mlple small  nodules/ GOO, left upper lung/  f/p ct chest, 2  months. [facility], [facility]  echo,    normal  Ef  carotid  dopplers   with   69 %  stenosis, Left ica/  discussed  with  neuro, no  intervention/ on asa/ lipitor.  prt  to  f/p  with neuro     now  admitted with dehydration/ hyponatremia  and  pt restarted  her  diuretic  pt has np edema. appears euvolemic  hyponatremia  of  115, on arrival, is  118  today/ with low  chloride/  Sodium was  140  on 10/19/20   follow [facility]/   House renal eval  pt instructed   again, to stop  her diuretics   and  to  have  adequate  fluid intake  known to  card/HTN, on cozaar  check orthostatics  on  dvt ppx       ec< from: CT Angio Chest w/ IV Cont (10.17.20 @ 02:45) >  IMPRESSION:  No evidence of main, lobar, or proximal segmental pulmonary artery embolism. Limited evaluation the distal segmental and subsegmental pulmonary arteries.  Small focal subpleural groundglass opacity in the left upper lobe measuring 1 cm, nonspecific, potentially infectious or inflammatory, although a follow-up chest CT in 6 months should be performed to document resolution or stability.    Several scattered solid lung nodules measuring under 6 mm, nonspecific. In a low risk patient, no routine follow-up is needed. In a high-risk patient, consider follow-up chest CT in 12 months.  < end of copied text >       rd< from: CT Head No Cont (10.17.20 @ 01:47) >  MPRESSION:  No acute intracranial hemorrhage, large territorial infarct, or mass effect.    < end of copied text >

## 2020-10-24 NOTE — PHYSICAL THERAPY INITIAL EVALUATION ADULT - PERTINENT HX OF CURRENT PROBLEM, REHAB EVAL
70F with PMHx of breast cancer, hypertension, ductal carcinoma of breast, and moderate carotid stenosis presents for two day history of nausea, vomiting, and decrease PO intake and found to be severely hyponatremic. Patient likely suffering from symptomatic hyponatremia with CT head one day prior to admission being unremarkable. 70F with PMHx of breast cancer, hypertension, ductal carcinoma of breast, and moderate carotid stenosis presents for two day history of nausea, vomiting, and decrease PO intake and found to be severely hyponatremic. Patient likely suffering from symptomatic hyponatremia with CT head one day PTA (-), one week PTA pt w syncopal episode,  found to have internal carotid stenosis and started on ASA 70F  admitted to [facility] on 10/23/20 with PMHx of breast cancer, hypertension, ductal carcinoma of breast, and moderate carotid stenosis presents for two day history of nausea, vomiting, and decrease PO intake and found to be severely hyponatremic. Patient likely suffering from symptomatic hyponatremia with CT head one day PTA (-), one week PTA pt w syncopal episode,  found to have internal carotid stenosis and started on ASA

## 2020-10-25 DIAGNOSIS — I10 ESSENTIAL (PRIMARY) HYPERTENSION: ICD-10-CM

## 2020-10-25 LAB
ANION GAP SERPL CALC-SCNC: 11 MMOL/L — SIGNIFICANT CHANGE UP (ref 5–17)
ANION GAP SERPL CALC-SCNC: 8 MMOL/L — SIGNIFICANT CHANGE UP (ref 5–17)
ANION GAP SERPL CALC-SCNC: 9 MMOL/L — SIGNIFICANT CHANGE UP (ref 5–17)
ANION GAP SERPL CALC-SCNC: 9 MMOL/L — SIGNIFICANT CHANGE UP (ref 5–17)
BUN SERPL-MCNC: 10 MG/DL — SIGNIFICANT CHANGE UP (ref 7–23)
BUN SERPL-MCNC: 11 MG/DL — SIGNIFICANT CHANGE UP (ref 7–23)
BUN SERPL-MCNC: 8 MG/DL — SIGNIFICANT CHANGE UP (ref 7–23)
BUN SERPL-MCNC: 9 MG/DL — SIGNIFICANT CHANGE UP (ref 7–23)
CALCIUM SERPL-MCNC: 8.2 MG/DL — LOW (ref 8.4–10.5)
CALCIUM SERPL-MCNC: 8.5 MG/DL — SIGNIFICANT CHANGE UP (ref 8.4–10.5)
CALCIUM SERPL-MCNC: 8.6 MG/DL — SIGNIFICANT CHANGE UP (ref 8.4–10.5)
CALCIUM SERPL-MCNC: 8.8 MG/DL — SIGNIFICANT CHANGE UP (ref 8.4–10.5)
CHLORIDE SERPL-SCNC: 88 MMOL/L — LOW (ref 96–108)
CHLORIDE SERPL-SCNC: 89 MMOL/L — LOW (ref 96–108)
CHLORIDE SERPL-SCNC: 89 MMOL/L — LOW (ref 96–108)
CHLORIDE SERPL-SCNC: 93 MMOL/L — LOW (ref 96–108)
CO2 SERPL-SCNC: 22 MMOL/L — SIGNIFICANT CHANGE UP (ref 22–31)
CO2 SERPL-SCNC: 23 MMOL/L — SIGNIFICANT CHANGE UP (ref 22–31)
CO2 SERPL-SCNC: 23 MMOL/L — SIGNIFICANT CHANGE UP (ref 22–31)
CO2 SERPL-SCNC: 24 MMOL/L — SIGNIFICANT CHANGE UP (ref 22–31)
CREAT SERPL-MCNC: 0.48 MG/DL — LOW (ref 0.5–1.3)
CREAT SERPL-MCNC: 0.5 MG/DL — SIGNIFICANT CHANGE UP (ref 0.5–1.3)
CREAT SERPL-MCNC: 0.56 MG/DL — SIGNIFICANT CHANGE UP (ref 0.5–1.3)
CREAT SERPL-MCNC: 0.65 MG/DL — SIGNIFICANT CHANGE UP (ref 0.5–1.3)
GLUCOSE SERPL-MCNC: 107 MG/DL — HIGH (ref 70–99)
GLUCOSE SERPL-MCNC: 65 MG/DL — LOW (ref 70–99)
GLUCOSE SERPL-MCNC: 84 MG/DL — SIGNIFICANT CHANGE UP (ref 70–99)
GLUCOSE SERPL-MCNC: 98 MG/DL — SIGNIFICANT CHANGE UP (ref 70–99)
OSMOLALITY UR: 400 MOSM/KG — SIGNIFICANT CHANGE UP (ref 50–1200)
POTASSIUM SERPL-MCNC: 4 MMOL/L — SIGNIFICANT CHANGE UP (ref 3.5–5.3)
POTASSIUM SERPL-MCNC: 4.1 MMOL/L — SIGNIFICANT CHANGE UP (ref 3.5–5.3)
POTASSIUM SERPL-SCNC: 4 MMOL/L — SIGNIFICANT CHANGE UP (ref 3.5–5.3)
POTASSIUM SERPL-SCNC: 4.1 MMOL/L — SIGNIFICANT CHANGE UP (ref 3.5–5.3)
SODIUM SERPL-SCNC: 120 MMOL/L — CRITICAL LOW (ref 135–145)
SODIUM SERPL-SCNC: 121 MMOL/L — LOW (ref 135–145)
SODIUM SERPL-SCNC: 123 MMOL/L — LOW (ref 135–145)
SODIUM SERPL-SCNC: 124 MMOL/L — LOW (ref 135–145)

## 2020-10-25 PROCEDURE — 99232 SBSQ HOSP IP/OBS MODERATE 35: CPT | Mod: GC

## 2020-10-25 RX ORDER — SODIUM CHLORIDE 5 G/100ML
1000 INJECTION, SOLUTION INTRAVENOUS
Refills: 0 | Status: DISCONTINUED | OUTPATIENT
Start: 2020-10-25 | End: 2020-10-26

## 2020-10-25 RX ORDER — SODIUM CHLORIDE 5 G/100ML
1000 INJECTION, SOLUTION INTRAVENOUS
Refills: 0 | Status: DISCONTINUED | OUTPATIENT
Start: 2020-10-25 | End: 2020-10-25

## 2020-10-25 RX ORDER — AMLODIPINE BESYLATE 2.5 MG/1
2.5 TABLET ORAL DAILY
Refills: 0 | Status: DISCONTINUED | OUTPATIENT
Start: 2020-10-25 | End: 2020-10-26

## 2020-10-25 RX ADMIN — Medication 100 MILLIGRAM(S): at 21:43

## 2020-10-25 RX ADMIN — AMLODIPINE BESYLATE 2.5 MILLIGRAM(S): 2.5 TABLET ORAL at 11:40

## 2020-10-25 RX ADMIN — ATORVASTATIN CALCIUM 10 MILLIGRAM(S): 80 TABLET, FILM COATED ORAL at 21:43

## 2020-10-25 RX ADMIN — Medication 650 MILLIGRAM(S): at 11:41

## 2020-10-25 RX ADMIN — Medication 81 MILLIGRAM(S): at 11:40

## 2020-10-25 RX ADMIN — SODIUM CHLORIDE 35 MILLILITER(S): 5 INJECTION, SOLUTION INTRAVENOUS at 11:29

## 2020-10-25 RX ADMIN — ANASTROZOLE 1 MILLIGRAM(S): 1 TABLET ORAL at 11:40

## 2020-10-25 RX ADMIN — LOSARTAN POTASSIUM 50 MILLIGRAM(S): 100 TABLET, FILM COATED ORAL at 21:43

## 2020-10-25 RX ADMIN — SODIUM CHLORIDE 40 MILLILITER(S): 5 INJECTION, SOLUTION INTRAVENOUS at 22:49

## 2020-10-25 NOTE — PROGRESS NOTE ADULT - ASSESSMENT
70 female,   Hx: HTN ,  Ca  breast  2018,  s/p  lumpectomy/ path, invasive  ductal  cancer, on anastrazole    s/p   multiple   syncopal episodes, from orthostasis/ then improved   Ct   angio on 10/17/20, no  PE/  mlple small  nodules/ GOO, left upper lung/  f/p ct chest, 2  months. [facility]  echo,    normal  Ef  carotid  dopplers   with   69 %  stenosis, Left ica/  discussed  with  neuro, no  intervention/ on asa/ lipitor.  prt  to  f/p  with neuro     now  admitted with dehydration/ hyponatremia  and  pt restarted  her  diuretic  pt has np edema. appears euvolemic  hyponatremia  of  115, on arrival, is  118  today/ with low  chloride/  Sodium was  140  on 10/19/20     House renal eval  pt instructed   again, to stop  her diuretics   and  to  have  adequate  fluid intake  known to  card/HTN, on cozaar  on  dvt ppx  hyponatremia,  of 121/  rx  plan pe r renal/ ? iv fluids       ec< from: CT Angio Chest w/ IV Cont (10.17.20 @ 02:45) >  IMPRESSION:  No evidence of main, lobar, or proximal segmental pulmonary artery embolism. Limited evaluation the distal segmental and subsegmental pulmonary arteries.  Small focal subpleural groundglass opacity in the left upper lobe measuring 1 cm, nonspecific, potentially infectious or inflammatory, although a follow-up chest CT in 6 months should be performed to document resolution or stability.    Several scattered solid lung nodules measuring under 6 mm, nonspecific. In a low risk patient, no routine follow-up is needed. In a high-risk patient, consider follow-up chest CT in 12 months.  < end of copied text >       rd< from: CT Head No Cont (10.17.20 @ 01:47) >  MPRESSION:  No acute intracranial hemorrhage, large territorial infarct, or mass effect.    < end of copied text >

## 2020-10-25 NOTE — CONSULT NOTE ADULT - ASSESSMENT
70F with PMHx of breast cancer, hypertension, ductal carcinoma of breast, and moderate carotid stenosis presents for two day history of nausea, vomiting, and decrease PO intake. One week prior patient had a syncopal event with no prodromal syndromes. She had unremarkable CTH, CTA Chest, TTE, and found to have moderate internal carotid stenosis of left and started on aspirin and statin. Since then she has been feeling unwell. She has had loss of appetite because she feels nauseous each time she eats. No syncopal episodes since. States her BP has been usually elevated. She thought it was because of hypertension so she took 25 mg of HCTZ yesterday. She presented to the ED yesterday twice and had an unremarkable CT head. No blood work was done. Patient denies over the counter supplements. She does not partake in ectasy. No blood work drawn in past several days. Today patient given 1 L LR and found to have Na of 110 on VBG and 115 on BMP. Patient started on 2% NS at 50 cc/hr. When seen by me in ED she has no complaints. She states her urinary output as decreased which she thinks is because of lack of appetite.    pt with uncontrolled htn, hyponatremia and possible syncope.  pt was seen by me on the last admission with the same complain and found to be orthostatic sec to dehydration and decrease po intake.  will adjust bp meds  tsh  renal appreciated  may need to start on hydralazine prn  check orthostatic  am cortisol level

## 2020-10-25 NOTE — CONSULT NOTE ADULT - SUBJECTIVE AND OBJECTIVE BOX
CHIEF COMPLAINT:Patient is a 70y old  Female who presents with a chief complaint of Headache/Syncope (25 Oct 2020 10:26)      HPI:  70F with PMHx of breast cancer, hypertension, ductal carcinoma of breast, and moderate carotid stenosis presents for two day history of nausea, vomiting, and decrease PO intake. One week prior patient had a syncopal event with no prodromal syndromes. She had unremarkable CTH, CTA Chest, TTE, and found to have moderate internal carotid stenosis of left and started on aspirin and statin. Since then she has been feeling unwell. She has had loss of appetite because she feels nauseous each time she eats. No syncopal episodes since. States her BP has been usually elevated. She thought it was because of hypertension so she took 25 mg of HCTZ yesterday. She presented to the ED yesterday twice and had an unremarkable CT head. No blood work was done. Patient denies over the counter supplements. She does not partake in ectasy. No blood work drawn in past several days. Today patient given 1 L LR and found to have Na of 110 on VBG and 115 on BMP. Patient started on 2% NS at 50 cc/hr. When seen by me in ED she has no complaints. She states her urinary output as decreased which she thinks is because of lack of appetite.        PAST MEDICAL & SURGICAL HISTORY:  Breast cancer    Osteoporosis    Hypertension    History of lumpectomy        MEDICATIONS  (STANDING):  amLODIPine   Tablet 2.5 milliGRAM(s) Oral daily  anastrozole 1 milliGRAM(s) Oral daily  aspirin  chewable 81 milliGRAM(s) Oral daily  atorvastatin 10 milliGRAM(s) Oral at bedtime  enoxaparin Injectable 40 milliGRAM(s) SubCutaneous daily  losartan 50 milliGRAM(s) Oral daily  sodium chloride 2% . 1000 milliLiter(s) (35 mL/Hr) IV Continuous <Continuous>    MEDICATIONS  (PRN):  acetaminophen   Tablet .. 650 milliGRAM(s) Oral every 6 hours PRN Temp greater or equal to 38C (100.4F), Mild Pain (1 - 3), Moderate Pain (4 - 6), Severe Pain (7 - 10)      FAMILY HISTORY:  FH: chronic kidney disease        SOCIAL HISTORY:    [ ] Non-smoker  [ ] Smoker  [ ] Alcohol    Allergies    Cipro (Rash)  penicillin (Rash)    Intolerances    	    REVIEW OF SYSTEMS:  CONSTITUTIONAL: No fever, weight loss, or fatigue  EYES: No eye pain, visual disturbances, or discharge  ENT:  No difficulty hearing, tinnitus, vertigo; No sinus or throat pain  NECK: No pain or stiffness  RESPIRATORY: No cough, wheezing, chills or hemoptysis; +Shortness of Breath  CARDIOVASCULAR: No chest pain, palpitations, passing out, dizziness, or leg swelling  GASTROINTESTINAL: No abdominal or epigastric pain. No nausea, vomiting, or hematemesis; No diarrhea or constipation. No melena or hematochezia.  GENITOURINARY: No dysuria, frequency, hematuria, or incontinence  NEUROLOGICAL: No headaches, memory loss, loss of strength, numbness, or tremors  SKIN: No itching, burning, rashes, or lesions   LYMPH Nodes: No enlarged glands  ENDOCRINE: No heat or cold intolerance; No hair loss  MUSCULOSKELETAL: No joint pain or swelling; No muscle, back, or extremity pain  PSYCHIATRIC: No depression, anxiety, mood swings, or difficulty sleeping  HEME/LYMPH: No easy bruising, or bleeding gums  ALLERGY AND IMMUNOLOGIC: No hives or eczema	    [ ] All others negative	  [ ] Unable to obtain    PHYSICAL EXAM:  T(C): 36.9 (10-25-20 @ 09:46), Max: 36.9 (10-24-20 @ 20:59)  HR: 88 (10-25-20 @ 09:46) (73 - 88)  BP: 159/83 (10-25-20 @ 09:46) (143/81 - 169/88)  RR: 18 (10-25-20 @ 09:46) (18 - 18)  SpO2: 98% (10-25-20 @ 09:46) (96% - 100%)  Wt(kg): --  I&O's Summary    24 Oct 2020 07:01  -  25 Oct 2020 07:00  --------------------------------------------------------  IN: 1300 mL / OUT: 1125 mL / NET: 175 mL    25 Oct 2020 07:01  -  25 Oct 2020 11:57  --------------------------------------------------------  IN: 450 mL / OUT: 250 mL / NET: 200 mL        Appearance: Normal	  HEENT:   Normal oral mucosa, PERRL, EOMI	  Lymphatic: No lymphadenopathy  Cardiovascular: Normal S1 S2, No JVD, + murmurs, No edema  Respiratory: Lungs clear to auscultation	  Psychiatry: A & O x 3, Mood & affect appropriate  Gastrointestinal:  Soft, Non-tender, + BS	  Skin: No rashes, No ecchymoses, No cyanosis	  Neurologic: Non-focal  Extremities: Normal range of motion, No clubbing, cyanosis or edema  Vascular: Peripheral pulses palpable 2+ bilaterally    TELEMETRY: 	    ECG:  	  RADIOLOGY:  OTHER: 	  	  LABS:	 	    CARDIAC MARKERS:                              11.0   7.10  )-----------( 189      ( 24 Oct 2020 02:41 )             30.9     10-25    121<L>  |  89<L>  |  8   ----------------------------<  65<L>  4.0   |  24  |  0.48<L>    Ca    8.6      25 Oct 2020 07:50    TPro  7.6  /  Alb  4.2  /  TBili  0.6  /  DBili  x   /  AST  37  /  ALT  28  /  AlkPhos  66  10-23    proBNP:   Lipid Profile:   HgA1c:   TSH:       PREVIOUS DIAGNOSTIC TESTING:    < from: 12 Lead ECG (10.23.20 @ 19:30) >  Diagnosis Line NORMAL SINUS RHYTHM  POSSIBLE LEFT ATRIAL ENLARGEMENT  NONSPECIFIC ST ABNORMALITY  ABNORMAL ECG  WHEN COMPARED WITH ECG OF 17-OCT-2020 01:07,  NO SIGNIFICANT CHANGE WAS FOUND    < from: TTE with Doppler (w/3D Echo) (Transthoracic Echocardiogram) (10.17.20 @ 14:46) >  Mitral Valve: Normal mitral valve. Mild-moderate mitral  regurgitation.  Aortic Valve/Aorta: Normal trileaflet aortic valve. Peak  transaortic valve gradient equals 9 mm Hg, mean transaortic  valve gradient equals 5 mm Hg, aortic valve velocity time  integral equals 32 cm, estimated aortic valve area equals  2.5 sqcm. Peak left ventricular outflow tract gradient  equals 6 mm Hg, mean gradient is equal to 4 mm Hg, LVOT  velocity time integral equals 28 cm.  Aortic Root: 2.8 cm.  Left Atrium: Moderately dilated left atrium.  LA volume  index = 43 cc/m2.  Left Ventricle: Normal left ventricular systolicfunction.  No segmental wall motion abnormalities. Normal left  ventricular internal dimensions and wall thicknesses.  Right Heart: Normal right atrium. Normal right ventricular  size and function. Normal tricuspid valve. Mild tricuspid  regurgitation. Normal pulmonic valve.  Pericardium/Pleura: Normal pericardium with no pericardial  effusion.  Hemodynamic: Estimated right atrial pressure is 8 mm Hg.  Estimated right ventricular systolic pressure equals 31 mm  Hg, assuming right atrial pressure equals 8 mm Hg,  consistent with normal pulmonary pressures.  ------------------------------------------------------------------------  Conclusions:  1. Normal left ventricular internal dimensions and wall  thicknesses.  2. Normal left ventricular systolic function. No segmental  wall motion abnormalities.  3. Normal right ventricular size and function.        Recommendations:  - restart hypertonic saline 2% at 35 cc/hr for 4 hours only then repeat BMP, continue check monitor BMP q4hr  - HOLD thiazide and add to allergy list (cause hypoNa)  - monitor serum K (goal >4), will help improve serum Na level  - monitor BMP every 4 hours for now, check urine osmolality q12hr  - avoid rapid correction of serum Na, correction goal 6-8 mEq/24hrs (high risk of osmotic demyelination syndrome given age and diuretic)   - control nausea and headache if recurrent (currently not present)  please call nephrology fellow for update serum Na   GOAL sNa 127-130 mEq by 7PM 10/25      # hypertension, sBP 160s overnight  - please start amlodipine 2.5mg for better BP control, can titrate up if needed   - continue losartan 50mg daily for now, monitor BP (goal <140/80)

## 2020-10-25 NOTE — PROGRESS NOTE ADULT - SUBJECTIVE AND OBJECTIVE BOX
no compalints  REVIEW OF SYSTEMS:  GEN: no fever,    no chills  RESP: no SOB,   no cough  CVS: no chest pain,   no palpitations  GI: no abdominal pain,   no nausea,   no vomiting,   no constipation,   no diarrhea  : no dysuria,   no frequency  NEURO: no headache,   no dizziness  PSYCH: no depression,   not anxious  Derm : no rash    MEDICATIONS  (STANDING):  anastrozole 1 milliGRAM(s) Oral daily  aspirin  chewable 81 milliGRAM(s) Oral daily  atorvastatin 10 milliGRAM(s) Oral at bedtime  enoxaparin Injectable 40 milliGRAM(s) SubCutaneous daily  losartan 50 milliGRAM(s) Oral daily    MEDICATIONS  (PRN):  acetaminophen   Tablet .. 650 milliGRAM(s) Oral every 6 hours PRN Temp greater or equal to 38C (100.4F), Mild Pain (1 - 3), Moderate Pain (4 - 6), Severe Pain (7 - 10)      Vital Signs Last 24 Hrs  T(C): 36.6 (25 Oct 2020 04:51), Max: 36.9 (24 Oct 2020 20:59)  T(F): 97.9 (25 Oct 2020 04:51), Max: 98.4 (24 Oct 2020 20:59)  HR: 80 (25 Oct 2020 04:51) (73 - 87)  BP: 148/80 (25 Oct 2020 04:51) (143/81 - 169/88)  BP(mean): --  RR: 18 (25 Oct 2020 04:51) (18 - 18)  SpO2: 100% (25 Oct 2020 04:51) (96% - 100%)  CAPILLARY BLOOD GLUCOSE        I&O's Summary    24 Oct 2020 07:01  -  25 Oct 2020 07:00  --------------------------------------------------------  IN: 1300 mL / OUT: 1125 mL / NET: 175 mL        PHYSICAL EXAM:  HEAD:  Atraumatic, Normocephalic  NECK: Supple, No   JVD  CHEST/LUNG:   no     rales,     no,    rhonchi  HEART: Regular rate and rhythm;         murmur  ABDOMEN: Soft, Nontender, ;   EXTREMITIES:   no     edema  NEUROLOGY:  alert    LABS:                        11.0   7.10  )-----------( 189      ( 24 Oct 2020 02:41 )             30.9     10-    121<L>  |  89<L>  |  8   ----------------------------<  65<L>  4.0   |  24  |  0.48<L>    Ca    8.6      25 Oct 2020 07:50    TPro  7.6  /  Alb  4.2  /  TBili  0.6  /  DBili  x   /  AST  37  /  ALT  28  /  AlkPhos  66  10-23          Urinalysis Basic - ( 23 Oct 2020 22:42 )    Color: Yellow / Appearance: Clear / S.025 / pH: x  Gluc: x / Ketone: Large  / Bili: Negative / Urobili: Negative   Blood: x / Protein: Trace / Nitrite: Negative   Leuk Esterase: Negative / RBC: 4 /hpf / WBC 5 /HPF   Sq Epi: x / Non Sq Epi: 1 /hpf / Bacteria: Negative          10-23 @ 19:03  3.5  54      Thyroid Stimulating Hormone, Serum: 3.38 uIU/mL (10-24 @ 03:30)          Consultant(s) Notes Reviewed:      Care Discussed with Consultants/Other Providers:

## 2020-10-25 NOTE — CHART NOTE - NSCHARTNOTEFT_GEN_A_CORE
Notified by RN patient with recurrent low Na level of 120.   Discussed with Dr. Landaverde - Renal fellow and recommended to add 1L of fluid restriction to diet, add 2% of 40cc per 6 hours of hypertonic saline and repeat BMP at 6AM.         Elier Larkin  Dept of Medicine

## 2020-10-25 NOTE — PROGRESS NOTE ADULT - SUBJECTIVE AND OBJECTIVE BOX
[facility] DIVISION OF KIDNEY DISEASES AND HYPERTENSION   -- FOLLOW UP NOTE --   Yoko Landaverde  Nephrology Fellow  Pager [place]: [phone]   /  Pager [place]: [phone]  (after 5pm or weekend please page the on-call fellow)  --------------------------------------------------------------------------------  24 hour events/subjective:  - overnight hypertonic saline held given sNa at goal 121, vitals afebrile, hypertensive sBP 160s, total UOP voided 1.1L in the past 24hr  - patient seen and examined at bedside this morning without complaints report overall feeling much better  - vitals/lab/medications reviewed, noted for sNa improved 124 --> now 121      PAST HISTORY  --------------------------------------------------------------------------------  No significant changes to PMH, PSH, FHx, SHx, unless otherwise noted    ALLERGIES & MEDICATIONS  --------------------------------------------------------------------------------  Allergies    Cipro (Rash)  penicillin (Rash)    Intolerances      Standing Inpatient Medications  anastrozole 1 milliGRAM(s) Oral daily  aspirin  chewable 81 milliGRAM(s) Oral daily  atorvastatin 10 milliGRAM(s) Oral at bedtime  enoxaparin Injectable 40 milliGRAM(s) SubCutaneous daily  losartan 50 milliGRAM(s) Oral daily    PRN Inpatient Medications  acetaminophen   Tablet .. 650 milliGRAM(s) Oral every 6 hours PRN      REVIEW OF SYSTEMS  --------------------------------------------------------------------------------  Gen: no fever, chills, weakness  Respiratory: No dyspnea, cough  CV: No chest pain, orthopnea  GI: No abdominal pain, nausea, vomiting, diarrhea  MSK: no edema  Neuro: No dizziness, lightheadedness  Heme: No bleeding  All other systems were reviewed and are negative, except as noted.    VITALS/PHYSICAL EXAM  --------------------------------------------------------------------------------  T(C): 36.9 (10-25-20 @ 09:46), Max: 36.9 (10-24-20 @ 20:59)  HR: 88 (10-25-20 @ 09:46) (73 - 88)  BP: 159/83 (10-25-20 @ 09:46) (143/81 - 169/88)  RR: 18 (10-25-20 @ 09:46) (18 - 18)  SpO2: 98% (10-25-20 @ 09:46) (96% - 100%)  Wt(kg): --  Height (cm): 165.1 (10-23-20 @ 15:17)  Weight (kg): 56.2 (10-23-20 @ 15:17)  BMI (kg/m2): 20.6 (10-23-20 @ 15:17)  BSA (m2): 1.61 (10-23-20 @ 15:17)    10-24-20 @ 07:01  -  10-25-20 @ 07:00  --------------------------------------------------------  IN: 1300 mL / OUT: 1125 mL / NET: 175 mL    10-25-20 @ 07:01  -  10-25-20 @ 10:26  --------------------------------------------------------  IN: 450 mL / OUT: 250 mL / NET: 200 mL    Physical Exam:  	Gen: NAD, well-appearing on room air  	HEENT: moist mucous membrane  	Pulm: CTA B/L, no crackles  	CV: RRR, S1S2; no rub/murmur  	GI: +BS, soft, nontender/nondistended  	: No suprapubic tenderness  	MSK: Warm, no edema              Neuro: AAOx3  	Psych: Normal affect and mood  	Skin: Warm    LABS/STUDIES  --------------------------------------------------------------------------------              11.0   7.10  >-----------<  189      [10-24-20 @ 02:41]              30.9     121  |  89  |  8   ----------------------------<  65      [10-25-20 @ 07:50]  4.0   |  24  |  0.48        Ca     8.6     [10-25-20 @ 07:50]    TPro  7.6  /  Alb  4.2  /  TBili  0.6  /  DBili  x   /  AST  37  /  ALT  28  /  AlkPhos  66  [10-23-20 @ 19:03]        Serum Osmolality 244      [10-23-20 @ 19:45]    Creatinine Trend:  SCr 0.48 [10-25 @ 07:50]  SCr 0.50 [10-25 @ 00:45]  SCr 0.50 [10-24 @ 17:54]  SCr 0.46 [10-24 @ 13:42]  SCr 0.48 [10-24 @ 02:41]    Urinalysis - [10-23-20 @ 22:42]      Color Yellow / Appearance Clear / SG 1.025 / pH 6.5      Gluc Negative / Ketone Large  / Bili Negative / Urobili Negative       Blood Negative / Protein Trace / Leuk Est Negative / Nitrite Negative      RBC 4 / WBC 5 / Hyaline 0 / Gran  / Sq Epi  / Non Sq Epi 1 / Bacteria Negative    Urine Sodium 35      [10-24-20 @ 16:27]  Urine Osmolality 400      [10-25-20 @ 00:08]    TSH 3.38      [10-24-20 @ 03:30]  Lipid: chol 207, TG 93, HDL 91, LDL 98      [10-18-20 @ 11:51]    HCV 0.10, Nonreact      [10-18-20 @ 09:06]

## 2020-10-25 NOTE — PROGRESS NOTE ADULT - ASSESSMENT
70F PMHx breast cancer, HTN who present to ED after fall at home - admitted for hyponatremia.  Nephrology consulted for hyponatremia.       # Hyponatremia (asymptomatic) likely multifactorial including thiazide and SIADH in setting of pain (concussion), nausea, vomiting, stress  On admission sNa 115, sOsm 244, UA specific gravity 1.025, started on hypertonic saline 2%, improved to 124 on 10/24, last sNa 121    Recommendations:  - restart hypertonic saline 2% at 35 cc/hr for 4 hours only then repeat BMP, continue check monitor BMP q4hr  - HOLD thiazide and add to allergy list (cause hypoNa)  - monitor serum K (goal >4), will help improve serum Na level  - monitor BMP every 4 hours for now, check urine osmolality q12hr  - avoid rapid correction of serum Na, correction goal 6-8 mEq/24hrs (high risk of osmotic demyelination syndrome given age and diuretic)   - control nausea and headache if recurrent (currently not present)  please call nephrology fellow for update serum Na   GOAL sNa 127-130 mEq by 7PM 10/25      # hypertension, sBP 160s overnight  - please start amlodipine 2.5mg for better BP control, can titrate up if needed   - continue losartan 50mg daily for now, monitor BP (goal <140/80)

## 2020-10-25 NOTE — CHART NOTE - NSCHARTNOTEFT_GEN_A_CORE
Notified by RN that patient c/o cough    . Pt seen and examined at bedside. Denies any chest pain, palpitations, SOB, abdominal pain, headache or urinary symptoms. Patient reports dry cough worse st nights.    Vital Signs Last 24 Hrs  T(C): 36.6 (25 Oct 2020 17:00), Max: 36.9 (24 Oct 2020 20:59)  T(F): 97.8 (25 Oct 2020 17:00), Max: 98.5 (25 Oct 2020 09:46)  HR: 73 (25 Oct 2020 17:00) (73 - 88)  BP: 163/82 (25 Oct 2020 17:00) (143/81 - 163/82)  BP(mean): --  RR: 18 (25 Oct 2020 17:00) (18 - 18)  SpO2: 99% (25 Oct 2020 17:00) (96% - 100%)  PHYSICAL EXAM:     General: NAD, non-toxic appearance  Neuro: NC, AT, PERRLA, no focal deficits  CV: S1 S2 RRR  Resp: B/L Lungs CTA, nonlabored  Abd: soft, NT, ND, + BS X4 quadrants  Ext: no edema, +PP b/l LE, warm to touch                           11.0   7.10  )-----------( 189      ( 24 Oct 2020 02:41 )             30.9     10-25    123<L>  |  89<L>  |  10  ----------------------------<  107<H>  4.1   |  23  |  0.65    Ca    8.8      25 Oct 2020 16:02    TPro  7.6  /  Alb  4.2  /  TBili  0.6  /  DBili  x   /  AST  37  /  ALT  28  /  AlkPhos  66  10-23        Assessment & Plan   HPI:  70F with PMHx of breast cancer, hypertension, ductal carcinoma of breast, and moderate carotid stenosis presents for two day history of nausea, vomiting, and decrease PO intake. Patient now with nonproductive cough.    Pt acutely presenting with fever of     - Cont current meds  - Start tessalon pearls 100mg TID PRN  - Will continue to monitor  F/U with primary team

## 2020-10-26 LAB
ANION GAP SERPL CALC-SCNC: 8 MMOL/L — SIGNIFICANT CHANGE UP (ref 5–17)
ANION GAP SERPL CALC-SCNC: 8 MMOL/L — SIGNIFICANT CHANGE UP (ref 5–17)
ANION GAP SERPL CALC-SCNC: 9 MMOL/L — SIGNIFICANT CHANGE UP (ref 5–17)
BUN SERPL-MCNC: 5 MG/DL — LOW (ref 7–23)
BUN SERPL-MCNC: 6 MG/DL — LOW (ref 7–23)
BUN SERPL-MCNC: 6 MG/DL — LOW (ref 7–23)
CALCIUM SERPL-MCNC: 8.3 MG/DL — LOW (ref 8.4–10.5)
CALCIUM SERPL-MCNC: 8.5 MG/DL — SIGNIFICANT CHANGE UP (ref 8.4–10.5)
CALCIUM SERPL-MCNC: 9.1 MG/DL — SIGNIFICANT CHANGE UP (ref 8.4–10.5)
CHLORIDE SERPL-SCNC: 83 MMOL/L — LOW (ref 96–108)
CHLORIDE SERPL-SCNC: 86 MMOL/L — LOW (ref 96–108)
CHLORIDE SERPL-SCNC: 91 MMOL/L — LOW (ref 96–108)
CO2 SERPL-SCNC: 21 MMOL/L — LOW (ref 22–31)
CO2 SERPL-SCNC: 21 MMOL/L — LOW (ref 22–31)
CO2 SERPL-SCNC: 25 MMOL/L — SIGNIFICANT CHANGE UP (ref 22–31)
CREAT SERPL-MCNC: 0.39 MG/DL — LOW (ref 0.5–1.3)
CREAT SERPL-MCNC: 0.42 MG/DL — LOW (ref 0.5–1.3)
CREAT SERPL-MCNC: 0.57 MG/DL — SIGNIFICANT CHANGE UP (ref 0.5–1.3)
GLUCOSE SERPL-MCNC: 85 MG/DL — SIGNIFICANT CHANGE UP (ref 70–99)
GLUCOSE SERPL-MCNC: 91 MG/DL — SIGNIFICANT CHANGE UP (ref 70–99)
GLUCOSE SERPL-MCNC: 98 MG/DL — SIGNIFICANT CHANGE UP (ref 70–99)
HCT VFR BLD CALC: 28.9 % — LOW (ref 34.5–45)
HGB BLD-MCNC: 9.9 G/DL — LOW (ref 11.5–15.5)
MCHC RBC-ENTMCNC: 27.4 PG — SIGNIFICANT CHANGE UP (ref 27–34)
MCHC RBC-ENTMCNC: 34.3 GM/DL — SIGNIFICANT CHANGE UP (ref 32–36)
MCV RBC AUTO: 80.1 FL — SIGNIFICANT CHANGE UP (ref 80–100)
NRBC # BLD: 0 /100 WBCS — SIGNIFICANT CHANGE UP (ref 0–0)
OSMOLALITY UR: 576 MOSM/KG — SIGNIFICANT CHANGE UP (ref 50–1200)
PLATELET # BLD AUTO: 184 K/UL — SIGNIFICANT CHANGE UP (ref 150–400)
POTASSIUM SERPL-MCNC: 3.7 MMOL/L — SIGNIFICANT CHANGE UP (ref 3.5–5.3)
POTASSIUM SERPL-MCNC: 3.8 MMOL/L — SIGNIFICANT CHANGE UP (ref 3.5–5.3)
POTASSIUM SERPL-MCNC: 3.8 MMOL/L — SIGNIFICANT CHANGE UP (ref 3.5–5.3)
POTASSIUM SERPL-SCNC: 3.7 MMOL/L — SIGNIFICANT CHANGE UP (ref 3.5–5.3)
POTASSIUM SERPL-SCNC: 3.8 MMOL/L — SIGNIFICANT CHANGE UP (ref 3.5–5.3)
POTASSIUM SERPL-SCNC: 3.8 MMOL/L — SIGNIFICANT CHANGE UP (ref 3.5–5.3)
RBC # BLD: 3.61 M/UL — LOW (ref 3.8–5.2)
RBC # FLD: 12.1 % — SIGNIFICANT CHANGE UP (ref 10.3–14.5)
SODIUM SERPL-SCNC: 113 MMOL/L — CRITICAL LOW (ref 135–145)
SODIUM SERPL-SCNC: 115 MMOL/L — CRITICAL LOW (ref 135–145)
SODIUM SERPL-SCNC: 124 MMOL/L — LOW (ref 135–145)
SODIUM UR-SCNC: 211 MMOL/L — SIGNIFICANT CHANGE UP
WBC # BLD: 6.47 K/UL — SIGNIFICANT CHANGE UP (ref 3.8–10.5)
WBC # FLD AUTO: 6.47 K/UL — SIGNIFICANT CHANGE UP (ref 3.8–10.5)

## 2020-10-26 PROCEDURE — 99233 SBSQ HOSP IP/OBS HIGH 50: CPT

## 2020-10-26 PROCEDURE — 99233 SBSQ HOSP IP/OBS HIGH 50: CPT | Mod: GC

## 2020-10-26 RX ORDER — PANTOPRAZOLE SODIUM 20 MG/1
40 TABLET, DELAYED RELEASE ORAL
Refills: 0 | Status: DISCONTINUED | OUTPATIENT
Start: 2020-10-26 | End: 2020-10-27

## 2020-10-26 RX ORDER — SODIUM CHLORIDE 5 G/100ML
1000 INJECTION, SOLUTION INTRAVENOUS
Refills: 0 | Status: DISCONTINUED | OUTPATIENT
Start: 2020-10-26 | End: 2020-10-26

## 2020-10-26 RX ORDER — SODIUM CHLORIDE 5 G/100ML
500 INJECTION, SOLUTION INTRAVENOUS
Refills: 0 | Status: DISCONTINUED | OUTPATIENT
Start: 2020-10-26 | End: 2020-10-26

## 2020-10-26 RX ADMIN — LOSARTAN POTASSIUM 50 MILLIGRAM(S): 100 TABLET, FILM COATED ORAL at 21:56

## 2020-10-26 RX ADMIN — AMLODIPINE BESYLATE 2.5 MILLIGRAM(S): 2.5 TABLET ORAL at 05:42

## 2020-10-26 RX ADMIN — Medication 81 MILLIGRAM(S): at 11:58

## 2020-10-26 RX ADMIN — ATORVASTATIN CALCIUM 10 MILLIGRAM(S): 80 TABLET, FILM COATED ORAL at 21:56

## 2020-10-26 RX ADMIN — SODIUM CHLORIDE 30 MILLILITER(S): 5 INJECTION, SOLUTION INTRAVENOUS at 16:07

## 2020-10-26 RX ADMIN — ANASTROZOLE 1 MILLIGRAM(S): 1 TABLET ORAL at 11:57

## 2020-10-26 RX ADMIN — ENOXAPARIN SODIUM 40 MILLIGRAM(S): 100 INJECTION SUBCUTANEOUS at 11:58

## 2020-10-26 NOTE — PROGRESS NOTE ADULT - ASSESSMENT
70 female,   Hx: HTN ,  Ca  breast  2018,  s/p  lumpectomy/ path, invasive  ductal  cancer, on anastrazole    s/p   multiple   syncopal episodes, from orthostasis/ then improved   Ct   angio on 10/17/20, no  PE/  mlple small  nodules/ GOO, left upper lung/  f/p ct chest, 2  months. [facility]  echo,    normal  Ef  carotid  dopplers   with   69 %  stenosis, Left ica/  discussed  with  neuro, no  intervention/ on asa/ lipitor.  prt  to  f/p  with neuro     now  admitted with dehydration/ hyponatremia  and  pt restarted  her  diuretic  pt has np edema. appears euvolemic  hyponatremia  of  115, on arrival,  with low  chloride/  Sodium was  140  on 10/19/20     House renal eval  pt instructed   again, to stop  her diuretics   and  to  have  adequate  fluid intake  known to  card/HTN, on cozaar  on  dvt ppx  hyponatremia,  has   worsened, to  115 now/ rx  a s pe r  house renal/ spoke  with dr stacy del rosairo yeaterday  hb of  9/  check rpt hb/  no rectal bleed/ gi eval       ec< from: CT Angio Chest w/ IV Cont (10.17.20 @ 02:45) >  IMPRESSION:  No evidence of main, lobar, or proximal segmental pulmonary artery embolism. Limited evaluation the distal segmental and subsegmental pulmonary arteries.  Small focal subpleural groundglass opacity in the left upper lobe measuring 1 cm, nonspecific, potentially infectious or inflammatory, although a follow-up chest CT in 6 months should be performed to document resolution or stability.    Several scattered solid lung nodules measuring under 6 mm, nonspecific. In a low risk patient, no routine follow-up is needed. In a high-risk patient, consider follow-up chest CT in 12 months.  < end of copied text >       rd< from: CT Head No Cont (10.17.20 @ 01:47) >  MPRESSION:  No acute intracranial hemorrhage, large territorial infarct, or mass effect.    < end of copied text >

## 2020-10-26 NOTE — PROGRESS NOTE ADULT - ASSESSMENT
70F PMHx breast cancer, HTN who present to ED after fall at home - admitted for hyponatremia.  Nephrology following for hyponatremia.       # Hyponatremia (asymptomatic) likely multifactorial including thiazide and SIADH in setting of pain (concussion), nausea, vomiting, stress  On admission sNa 115, sOsm 244, UA specific gravity 1.025, started on hypertonic saline 2%, improved to 124 on 10/24, however decreased to 120 and to 115 this AM (10/26/20)  Last UOs: 400 Selena: 35    Recommendations:  - Check UOsm an Ulytes  - Fluid restrict 1L/day  - HOLD thiazide and add to allergy list (cause hypoNa)  - monitor serum K (goal >4), will help improve serum Na level  - monitor BMP every 4 hours for now, check urine osmolality q12hr  - avoid rapid correction of serum Na, correction goal 6-8 mEq/24hrs (high risk of osmotic demyelination syndrome given age and diuretic)   - control nausea and headache if recurrent (currently not present)        #Hypertension, sBP 160s overnight  - please start amlodipine 2.5mg for better BP control, can titrate up if needed   - continue losartan 50mg daily for now, monitor BP (goal <140/80)    If you have any questions, please feel free to contact me  Selina Cisneros  Nephrology Fellow  Pager: [phone] / [phone]  (After 5pm or on weekends please page the on-call fellow)     70F PMHx breast cancer, HTN who present to ED after fall at home - admitted for hyponatremia.  Nephrology following for hyponatremia.       # Hyponatremia (asymptomatic) likely multifactorial including thiazide and SIADH in setting of pain (concussion), nausea, vomiting, stress  On admission sNa 115, sOsm 244, UA specific gravity 1.025, started on hypertonic saline 2%, improved to 124 on 10/24, however decreased to 120 and to 115 this AM (10/26/20)  Last UOs: 400 Selena: 35    Recommendations:  - Check UOsm an Ulytes  - Free water restriction 1L/day  - HOLD thiazide  - resume hypertonic saline  - monitor serum K (goal >4), will help improve serum Na level  - monitor BMP every 4 hours for now, check urine osmolality q12hr  - avoid rapid correction of serum Na, correction goal 6-8 mEq/24hrs (high risk of osmotic demyelination syndrome given age and diuretic)   - control nausea and headache if recurrent (currently not present)        #Hypertension, sBP 160s overnight  - please start amlodipine 2.5mg for better BP control, can titrate up if needed   - continue losartan 50mg daily for now, monitor BP (goal <140/80)    If you have any questions, please feel free to contact me  Selina Cisneros  Nephrology Fellow  Pager: [phone] / [phone]  (After 5pm or on weekends please page the on-call fellow)

## 2020-10-26 NOTE — CHART NOTE - NSCHARTNOTEFT_GEN_A_CORE
Following sodium results after hypertonic saline administration: Na 124, d/w results with nephrology, Dr. Robbins, no additional treatment at this time, will check labs in AM.      Nora Rubin NP, #[id number]

## 2020-10-26 NOTE — CONSULT NOTE ADULT - SUBJECTIVE AND OBJECTIVE BOX
Patient is a 70y old  Female who presented with a chief complaint of Headache/Syncope (26 Oct 2020 08:21)      HPI:  70F with PMHx of breast cancer, hypertension, ductal carcinoma of breast, and moderate carotid stenosis presents for two day history of nausea, vomiting, and decrease PO intake. One week prior patient had a syncopal event with no prodromal syndromes. She had unremarkable CTH, CTA Chest, TTE, and found to have moderate internal carotid stenosis of left and started on aspirin and statin. Since then she has been feeling unwell. She has had loss of appetite because she feels nauseous each time she eats. No syncopal episodes since. States her BP has been usually elevated. She thought it was because of hypertension so she took 25 mg of HCTZ yesterday. She presented to the ED yesterday twice and had an unremarkable CT head. No blood work was done. Patient denies over the counter supplements. She does not partake in ectasy. No blood work drawn in past several days. Today patient given 1 L LR and found to have Na of 110 on VBG and 115 on BMP. Patient started on 2% NS at 50 cc/hr. When seen by me in ED she has no complaints. She states her urinary output as decreased which she thinks is because of lack of appetite.  (23 Oct 2020 20:13)      Pt has seen GI previously, has not followed up further. Asked to evaluate for GI care/opinion    Past Colonoscopy ~1 year ago - negative  No abdominal pain    had severe nausea/vomiting prior to admission for 2-3 days - found to have severe hyponatremia - now tolerating PO diet without nausea or vomiting  +BM - brown stool    No weight loss, dysphagia, early satiety, dyspepsia  no personal or FH of GI malignancy  reports run/power walk 5 miles daily; baseline drink 4 water bottles (half liter each) daily    PAST MEDICAL & SURGICAL HISTORY:  Breast cancer  Osteoporosis  Hypertension  History of lumpectomy      Allergies  Cipro (Rash)  penicillin (Rash)      MEDICATIONS  (STANDING):  anastrozole 1 milliGRAM(s) Oral daily  aspirin  chewable 81 milliGRAM(s) Oral daily  atorvastatin 10 milliGRAM(s) Oral at bedtime  enoxaparin Injectable 40 milliGRAM(s) SubCutaneous daily  losartan 50 milliGRAM(s) Oral daily  sodium chloride 2% . 1000 milliLiter(s) (40 mL/Hr) IV Continuous <Continuous>    MEDICATIONS  (PRN):  acetaminophen   Tablet .. 650 milliGRAM(s) Oral every 6 hours PRN Temp greater or equal to 38C (100.4F), Mild Pain (1 - 3), Moderate Pain (4 - 6), Severe Pain (7 - 10)  benzonatate 100 milliGRAM(s) Oral three times a day PRN Cough      Social History:  +  reports run/power walk 5 miles daily    Family History   IBD (  ) Yes   (X  ) No  GI Malignancy (  )  Yes    ( X) No    Health Management  Last Colonoscopy - 1 year ago - negative per pt report      Advanced Directives: (   X  ) None    (      ) DNR    (     ) DNI    (     ) Health Care Proxy:     Review of Systems:    General:  No wt loss, fevers, chills, night sweats  CV:  No pain, palpitations, +HTN  Resp:  No dyspnea, cough, tachypnea, wheezing  GI: see HPI  :  No pain, bleeding, +hyponatremia - Renal following  Muscle:  No pain, weakness  Neuro:  No weakness, tingling, memory problems  Psych:  No fatigue, insomnia, mood problems, depression  Endocrine:  No polyuria, polydypsia, cold/heat intolerance  Heme:  No petechiae, ecchymosis, easy bruisability  Skin:  No rash, tattoos, scars, edema      Vital Signs Last 24 Hrs  T(C): 36.9 (26 Oct 2020 13:17), Max: 36.9 (26 Oct 2020 13:17)  T(F): 98.4 (26 Oct 2020 13:17), Max: 98.4 (26 Oct 2020 13:17)  HR: 81 (26 Oct 2020 13:17) (67 - 87)  BP: 166/93 (26 Oct 2020 13:17) (153/82 - 166/93)  BP(mean): --  RR: 18 (26 Oct 2020 13:17) (18 - 18)  SpO2: 98% (26 Oct 2020 13:17) (97% - 99%)    PHYSICAL EXAM:    Constitutional: NAD, well-developed non toxic appearing  Neck: No LAD, supple no JVD  Respiratory: CTA and P  Cardiovascular: S1 and S2, RRR  Gastrointestinal: BS+, soft, NT/ND, neg HSM,  Extremities: No peripheral edema, neg clubbing, cyanosis  Vascular: 2+ peripheral pulses  Neurological: A/O x 3, no focal deficits  Psychiatric: Normal mood, normal affect  Skin: No rashes, anicteric        LABS:                        9.9    6.47  )-----------( 184      ( 26 Oct 2020 06:58 )             28.9   Mean Cell Volume: 80.1 fl (10.26.20 @ 06:58)     Hemoglobin: 9.9 g/dL (10.26.20 @ 06:58)   Hemoglobin: 11.0 g/dL (10.24.20 @ 02:41)   Hemoglobin: 12.7 g/dL (10.23.20 @ 19:03)   Hemoglobin: 11.4 g/dL (10.19.20 @ 06:00)       10-26    113<LL>  |  83<L>  |  5<L>  ----------------------------<  91  3.8   |  21<L>  |  0.39<L>    Ca    8.5      26 Oct 2020 13:16      Thyroid Stimulating Hormone, Serum: 3.38 uIU/mL (10.24.20 @ 03:30)     LIVER FUNCTIONS - ( 23 Oct 2020 19:03 )  Alb: 4.2 g/dL / Pro: 7.6 g/dL / ALK PHOS: 66 U/L / ALT: 28 U/L / AST: 37 U/L / GGT: x             RADIOLOGY & ADDITIONAL TESTS:

## 2020-10-26 NOTE — PROGRESS NOTE ADULT - SUBJECTIVE AND OBJECTIVE BOX
CARDIOLOGY     PROGRESS  NOTE   ________________________________________________    CHIEF COMPLAINT:Patient is a 70y old  Female who presents with a chief complaint of Headache/Syncope (25 Oct 2020 11:56)  doing better.  	  REVIEW OF SYSTEMS:  CONSTITUTIONAL: No fever, weight loss, or fatigue  EYES: No eye pain, visual disturbances, or discharge  ENT:  No difficulty hearing, tinnitus, vertigo; No sinus or throat pain  NECK: No pain or stiffness  RESPIRATORY: No cough, wheezing, chills or hemoptysis; No Shortness of Breath  CARDIOVASCULAR: No chest pain, palpitations, passing out, dizziness, or leg swelling  GASTROINTESTINAL: No abdominal or epigastric pain. No nausea, vomiting, or hematemesis; No diarrhea or constipation. No melena or hematochezia.  GENITOURINARY: No dysuria, frequency, hematuria, or incontinence  NEUROLOGICAL: No headaches, memory loss, loss of strength, numbness, or tremors  SKIN: No itching, burning, rashes, or lesions   LYMPH Nodes: No enlarged glands  ENDOCRINE: No heat or cold intolerance; No hair loss  MUSCULOSKELETAL: No joint pain or swelling; No muscle, back, or extremity pain  PSYCHIATRIC: No depression, anxiety, mood swings, or difficulty sleeping  HEME/LYMPH: No easy bruising, or bleeding gums  ALLERGY AND IMMUNOLOGIC: No hives or eczema	    [ ] All others negative	  [ ] Unable to obtain    PHYSICAL EXAM:  T(C): 36.6 (10-26-20 @ 05:15), Max: 36.9 (10-25-20 @ 09:46)  HR: 87 (10-26-20 @ 05:15) (67 - 88)  BP: 166/78 (10-26-20 @ 05:15) (157/85 - 166/78)  RR: 18 (10-26-20 @ 05:15) (18 - 18)  SpO2: 99% (10-26-20 @ 05:15) (98% - 99%)  Wt(kg): --  I&O's Summary    25 Oct 2020 07:01  -  26 Oct 2020 07:00  --------------------------------------------------------  IN: 2060 mL / OUT: 250 mL / NET: 1810 mL        Appearance: Normal	  HEENT:   Normal oral mucosa, PERRL, EOMI	  Lymphatic: No lymphadenopathy  Cardiovascular: Normal S1 S2, No JVD, +murmurs, No edema  Respiratory: Lungs clear to auscultation	  Psychiatry: A & O x 3, Mood & affect appropriate  Gastrointestinal:  Soft, Non-tender, + BS	  Skin: No rashes, No ecchymoses, No cyanosis	  Neurologic: Non-focal  Extremities: Normal range of motion, No clubbing, cyanosis or edema  Vascular: Peripheral pulses palpable 2+ bilaterally    MEDICATIONS  (STANDING):  amLODIPine   Tablet 2.5 milliGRAM(s) Oral daily  anastrozole 1 milliGRAM(s) Oral daily  aspirin  chewable 81 milliGRAM(s) Oral daily  atorvastatin 10 milliGRAM(s) Oral at bedtime  enoxaparin Injectable 40 milliGRAM(s) SubCutaneous daily  losartan 50 milliGRAM(s) Oral daily  sodium chloride 2% . 1000 milliLiter(s) (40 mL/Hr) IV Continuous <Continuous>      TELEMETRY: 	    ECG:  	  RADIOLOGY:  OTHER: 	  	  LABS:	 	    CARDIAC MARKERS:                                9.9    6.47  )-----------( 184      ( 26 Oct 2020 06:58 )             28.9     10-26    115<LL>  |  86<L>  |  6<L>  ----------------------------<  85  3.8   |  21<L>  |  0.42<L>    Ca    8.3<L>      26 Oct 2020 06:52      proBNP: Serum Pro-Brain Natriuretic Peptide: 140 pg/mL (10-17 @ 01:31)    Lipid Profile: Cholesterol 207  LDL 98  HDL 91  TG 93    HgA1c:   TSH: Thyroid Stimulating Hormone, Serum: 3.38 uIU/mL (10-24 @ 03:30)  Thyroid Stimulating Hormone, Serum: 1.16 uIU/mL (10-18 @ 09:27)      < from: TTE with Doppler (w/3D Echo) (Transthoracic Echocardiogram) (10.17.20 @ 14:46) >  Mitral Valve: Normal mitral valve. Mild-moderate mitral  regurgitation.  Aortic Valve/Aorta: Normal trileaflet aortic valve. Peak  transaortic valve gradient equals 9 mm Hg, mean transaortic  valve gradient equals 5 mm Hg, aortic valve velocity time  integral equals 32 cm, estimated aortic valve area equals  2.5 sqcm. Peak left ventricular outflow tract gradient  equals 6 mm Hg, mean gradient is equal to 4 mm Hg, LVOT  velocity time integral equals 28 cm.  Aortic Root: 2.8 cm.  Left Atrium: Moderately dilated left atrium.  LA volume  index = 43 cc/m2.  Left Ventricle: Normal left ventricular systolicfunction.  No segmental wall motion abnormalities. Normal left  ventricular internal dimensions and wall thicknesses.  Right Heart: Normal right atrium. Normal right ventricular  size and function. Normal tricuspid valve. Mild tricuspid  regurgitation. Normal pulmonic valve.  Pericardium/Pleura: Normal pericardium with no pericardial  effusion.  Hemodynamic: Estimated right atrial pressure is 8 mm Hg.  Estimated right ventricular systolic pressure equals 31 mm  Hg, assuming right atrial pressure equals 8 mm Hg,  consistent with normal pulmonary pressures.  ------------------------------------------------------------------------  Conclusions:  1. Normal left ventricular internal dimensions and wall  thicknesses.  2. Normal left ventricular systolic function. No segmental  wall motion abnormalities.  3. Normal right ventricular size and function.    < end of copied text >      Assessment and plan  ---------------------------  70F with PMHx of breast cancer, hypertension, ductal carcinoma of breast, and moderate carotid stenosis presents for two day history of nausea, vomiting, and decrease PO intake. One week prior patient had a syncopal event with no prodromal syndromes. She had unremarkable CTH, CTA Chest, TTE, and found to have moderate internal carotid stenosis of left and started on aspirin and statin. Since then she has been feeling unwell. She has had loss of appetite because she feels nauseous each time she eats. No syncopal episodes since. States her BP has been usually elevated. She thought it was because of hypertension so she took 25 mg of HCTZ yesterday. She presented to the ED yesterday twice and had an unremarkable CT head. No blood work was done. Patient denies over the counter supplements. She does not partake in ectasy. No blood work drawn in past several days. Today patient given 1 L LR and found to have Na of 110 on VBG and 115 on BMP. Patient started on 2% NS at 50 cc/hr. When seen by me in ED she has no complaints. She states her urinary output as decreased which she thinks is because of lack of appetite.    pt with uncontrolled htn, hyponatremia and possible syncope.  pt was seen by me on the last admission with the same complain and found to be orthostatic sec to dehydration and decrease po intake.  will adjust bp meds  tsh  renal appreciated  may need to start on hydralazine prn  check orthostatic  am cortisol level  still hyponatremic as per renal  dc Norvasc with hx of orthostatic hypotension, still awaiting orthostatic numbers

## 2020-10-26 NOTE — PROGRESS NOTE ADULT - SUBJECTIVE AND OBJECTIVE BOX
no rectal bleed    REVIEW OF SYSTEMS:  GEN: no fever,    no chills  RESP: no SOB,   no cough  CVS: no chest pain,   no palpitations  GI: no abdominal pain,   no nausea,   no vomiting,   no constipation,   no diarrhea  : no dysuria,   no frequency  NEURO: no headache,   no dizziness  PSYCH: no depression,   not anxious  Derm : no rash    MEDICATIONS  (STANDING):  anastrozole 1 milliGRAM(s) Oral daily  aspirin  chewable 81 milliGRAM(s) Oral daily  atorvastatin 10 milliGRAM(s) Oral at bedtime  enoxaparin Injectable 40 milliGRAM(s) SubCutaneous daily  losartan 50 milliGRAM(s) Oral daily  sodium chloride 2% . 1000 milliLiter(s) (40 mL/Hr) IV Continuous <Continuous>  sodium chloride 2% . 1000 milliLiter(s) (40 mL/Hr) IV Continuous <Continuous>    MEDICATIONS  (PRN):  acetaminophen   Tablet .. 650 milliGRAM(s) Oral every 6 hours PRN Temp greater or equal to 38C (100.4F), Mild Pain (1 - 3), Moderate Pain (4 - 6), Severe Pain (7 - 10)  benzonatate 100 milliGRAM(s) Oral three times a day PRN Cough      Vital Signs Last 24 Hrs  T(C): 36.6 (26 Oct 2020 05:15), Max: 36.9 (25 Oct 2020 09:46)  T(F): 97.8 (26 Oct 2020 05:15), Max: 98.5 (25 Oct 2020 09:46)  HR: 87 (26 Oct 2020 05:15) (67 - 88)  BP: 166/78 (26 Oct 2020 05:15) (157/85 - 166/78)  BP(mean): --  RR: 18 (26 Oct 2020 05:15) (18 - 18)  SpO2: 99% (26 Oct 2020 05:15) (98% - 99%)  CAPILLARY BLOOD GLUCOSE        I&O's Summary    25 Oct 2020 07:01  -  26 Oct 2020 07:00  --------------------------------------------------------  IN: 2060 mL / OUT: 250 mL / NET: 1810 mL        PHYSICAL EXAM:  HEAD:  Atraumatic, Normocephalic  NECK: Supple, No   JVD  CHEST/LUNG:   no     rales,     no,    rhonchi  HEART: Regular rate and rhythm;         murmur  ABDOMEN: Soft, Nontender, ;   EXTREMITIES:     no   edema  NEUROLOGY:  alert    LABS:                        9.9    6.47  )-----------( 184      ( 26 Oct 2020 06:58 )             28.9     10-26    115<LL>  |  86<L>  |  6<L>  ----------------------------<  85  3.8   |  21<L>  |  0.42<L>    Ca    8.3<L>      26 Oct 2020 06:52                      Thyroid Stimulating Hormone, Serum: 3.38 uIU/mL (10-24 @ 03:30)          Consultant(s) Notes Reviewed:      Care Discussed with Consultants/Other Providers:

## 2020-10-26 NOTE — CONSULT NOTE ADULT - ASSESSMENT
70F with PMHx of breast cancer, hypertension, ductal carcinoma of breast, and moderate carotid stenosis presents for two day history of nausea, vomiting, and decrease PO intake. One week prior patient had a syncopal event with no prodromal syndromes. She had unremarkable CTH, CTA Chest, TTE, and found to have moderate internal carotid stenosis of left and started on aspirin and statin    Anemia - without overt/brisk GI bleed; reported negative colonoscopy 1 year ago  Severe hyponatremia - renal following      RECS:  -add PPI  -Monitor BMs, CBC  -Check iron, TIBC, ferritin, b12, folate in AM  -monitor lytes    No role for invasive GI evaluation at this time  Discussed with pt, all questions answered  D/W Medicine attending    Thank you for the courtesy of this consult.    Hadley Fong PA-C    [facility]  [phone]  After hours and weekend coverage [phone]

## 2020-10-26 NOTE — CONSULT NOTE ADULT - ATTENDING COMMENTS
Anemia, no gross gi bleeding at this time    plan ppi daily        agree with above management
Multifactorial hyponatremia   Nausea vomiting from concussion, high stress state ADH secretion while on thiazide and increased free water intake  Continue with 2% for now  Trend Uosm daily  Fluid restrict 1 liter; no further thiazide  Control her nausea  Avoid overcorrection     Karen Canela MD  Off: [phone]  Cell: [phone]

## 2020-10-26 NOTE — PROVIDER CONTACT NOTE (CRITICAL VALUE NOTIFICATION) - NAME OF MD/NP/PA/DO NOTIFIED:
lesia NP Allyson
FERNANDO Izquierdo
[facility] Med NP
attemptimg to call med NP  not answering phone
lesia NP Allyson

## 2020-10-26 NOTE — PROGRESS NOTE ADULT - SUBJECTIVE AND OBJECTIVE BOX
[facility] Division of Kidney Diseases & Hypertension  FOLLOW UP NOTE  [phone]--------------------------------------------------------------------------------    24 hour events/subjective:  - Patient was on 2% HTS overnight. Her SNa decreased to 120 and to 115 this AM  - Pt reports occasional lightheadedness but attributes this to her lack of sleep overnight  - She still has intermittent dry cough      PAST HISTORY  --------------------------------------------------------------------------------  No significant changes to PMH, PSH, FHx, SHx, unless otherwise noted    ALLERGIES & MEDICATIONS  --------------------------------------------------------------------------------  Allergies    Cipro (Rash)  penicillin (Rash)    Intolerances      Standing Inpatient Medications  anastrozole 1 milliGRAM(s) Oral daily  aspirin  chewable 81 milliGRAM(s) Oral daily  atorvastatin 10 milliGRAM(s) Oral at bedtime  enoxaparin Injectable 40 milliGRAM(s) SubCutaneous daily  losartan 50 milliGRAM(s) Oral daily  sodium chloride 2% . 1000 milliLiter(s) IV Continuous <Continuous>    PRN Inpatient Medications  acetaminophen   Tablet .. 650 milliGRAM(s) Oral every 6 hours PRN  benzonatate 100 milliGRAM(s) Oral three times a day PRN      REVIEW OF SYSTEMS  --------------------------------------------------------------------------------  Gen: no fever, chills, weakness  Respiratory: No dyspnea, cough  CV: No chest pain, orthopnea  GI: No abdominal pain, nausea, vomiting, diarrhea  MSK: no edema  Neuro: No dizziness, lightheadedness  Heme: No bleeding  All other systems were reviewed and are negative, except as noted.    VITALS/PHYSICAL EXAM  --------------------------------------------------------------------------------  T(C): 36.6 (10-26-20 @ 05:15), Max: 36.9 (10-25-20 @ 09:46)  HR: 87 (10-26-20 @ 05:15) (67 - 88)  BP: 166/78 (10-26-20 @ 05:15) (157/85 - 166/78)  RR: 18 (10-26-20 @ 05:15) (18 - 18)  SpO2: 99% (10-26-20 @ 05:15) (98% - 99%)  Wt(kg): --        10-25-20 @ 07:01  -  10-26-20 @ 07:00  --------------------------------------------------------  IN: 2060 mL / OUT: 250 mL / NET: 1810 mL    Physical Exam:  	Gen: NAD, well-appearing on room air  	HEENT: MMM  	Pulm: CTA B/L, no crackles  	CV: RRR, S1S2; no rub/murmur  	GI: +BS, soft, nontender/nondistended  	: No suprapubic tenderness  	MSK: Warm, no edema              Neuro: AAOx3  	Psych: Normal affect and mood  	Skin: Warm      LABS/STUDIES  --------------------------------------------------------------------------------              9.9    6.47  >-----------<  184      [10-26-20 @ 06:58]              28.9     115  |  86  |  6   ----------------------------<  85      [10-26-20 @ 06:52]  3.8   |  21  |  0.42        Ca     8.3     [10-26-20 @ 06:52]      Creatinine Trend:  SCr 0.42 [10-26 @ 06:52]  SCr 0.56 [10-25 @ 21:45]  SCr 0.65 [10-25 @ 16:02]  SCr 0.48 [10-25 @ 07:50]  SCr 0.50 [10-25 @ 00:45]    Urinalysis - [10-23-20 @ 22:42]      Color Yellow / Appearance Clear / SG 1.025 / pH 6.5      Gluc Negative / Ketone Large  / Bili Negative / Urobili Negative       Blood Negative / Protein Trace / Leuk Est Negative / Nitrite Negative      RBC 4 / WBC 5 / Hyaline 0 / Gran  / Sq Epi  / Non Sq Epi 1 / Bacteria Negative    Urine Sodium 35      [10-24-20 @ 16:27]  Urine Osmolality 400      [10-25-20 @ 00:08]    TSH 3.38      [10-24-20 @ 03:30]

## 2020-10-27 ENCOUNTER — TRANSCRIPTION ENCOUNTER (OUTPATIENT)
Age: 70
End: 2020-10-27

## 2020-10-27 VITALS
DIASTOLIC BLOOD PRESSURE: 81 MMHG | HEART RATE: 87 BPM | RESPIRATION RATE: 18 BRPM | OXYGEN SATURATION: 100 % | TEMPERATURE: 98 F | SYSTOLIC BLOOD PRESSURE: 134 MMHG

## 2020-10-27 LAB
ANION GAP SERPL CALC-SCNC: 11 MMOL/L — SIGNIFICANT CHANGE UP (ref 5–17)
ANION GAP SERPL CALC-SCNC: 7 MMOL/L — SIGNIFICANT CHANGE UP (ref 5–17)
ANION GAP SERPL CALC-SCNC: 9 MMOL/L — SIGNIFICANT CHANGE UP (ref 5–17)
BUN SERPL-MCNC: 6 MG/DL — LOW (ref 7–23)
BUN SERPL-MCNC: 6 MG/DL — LOW (ref 7–23)
BUN SERPL-MCNC: 8 MG/DL — SIGNIFICANT CHANGE UP (ref 7–23)
CALCIUM SERPL-MCNC: 9.4 MG/DL — SIGNIFICANT CHANGE UP (ref 8.4–10.5)
CALCIUM SERPL-MCNC: 9.7 MG/DL — SIGNIFICANT CHANGE UP (ref 8.4–10.5)
CALCIUM SERPL-MCNC: 9.8 MG/DL — SIGNIFICANT CHANGE UP (ref 8.4–10.5)
CHLORIDE SERPL-SCNC: 101 MMOL/L — SIGNIFICANT CHANGE UP (ref 96–108)
CHLORIDE SERPL-SCNC: 102 MMOL/L — SIGNIFICANT CHANGE UP (ref 96–108)
CHLORIDE SERPL-SCNC: 98 MMOL/L — SIGNIFICANT CHANGE UP (ref 96–108)
CO2 SERPL-SCNC: 25 MMOL/L — SIGNIFICANT CHANGE UP (ref 22–31)
CO2 SERPL-SCNC: 26 MMOL/L — SIGNIFICANT CHANGE UP (ref 22–31)
CO2 SERPL-SCNC: 26 MMOL/L — SIGNIFICANT CHANGE UP (ref 22–31)
CREAT SERPL-MCNC: 0.54 MG/DL — SIGNIFICANT CHANGE UP (ref 0.5–1.3)
CREAT SERPL-MCNC: 0.55 MG/DL — SIGNIFICANT CHANGE UP (ref 0.5–1.3)
CREAT SERPL-MCNC: 0.72 MG/DL — SIGNIFICANT CHANGE UP (ref 0.5–1.3)
FERRITIN SERPL-MCNC: 91 NG/ML — SIGNIFICANT CHANGE UP (ref 15–150)
FOLATE SERPL-MCNC: >20 NG/ML — SIGNIFICANT CHANGE UP
GLUCOSE SERPL-MCNC: 81 MG/DL — SIGNIFICANT CHANGE UP (ref 70–99)
GLUCOSE SERPL-MCNC: 90 MG/DL — SIGNIFICANT CHANGE UP (ref 70–99)
GLUCOSE SERPL-MCNC: 94 MG/DL — SIGNIFICANT CHANGE UP (ref 70–99)
HCT VFR BLD CALC: 34.7 % — SIGNIFICANT CHANGE UP (ref 34.5–45)
HGB BLD-MCNC: 11.6 G/DL — SIGNIFICANT CHANGE UP (ref 11.5–15.5)
IRON SATN MFR SERPL: 16 % — SIGNIFICANT CHANGE UP (ref 14–50)
IRON SATN MFR SERPL: 41 UG/DL — SIGNIFICANT CHANGE UP (ref 30–160)
MCHC RBC-ENTMCNC: 27.8 PG — SIGNIFICANT CHANGE UP (ref 27–34)
MCHC RBC-ENTMCNC: 33.4 GM/DL — SIGNIFICANT CHANGE UP (ref 32–36)
MCV RBC AUTO: 83.2 FL — SIGNIFICANT CHANGE UP (ref 80–100)
NRBC # BLD: 0 /100 WBCS — SIGNIFICANT CHANGE UP (ref 0–0)
PLATELET # BLD AUTO: 238 K/UL — SIGNIFICANT CHANGE UP (ref 150–400)
POTASSIUM SERPL-MCNC: 3.7 MMOL/L — SIGNIFICANT CHANGE UP (ref 3.5–5.3)
POTASSIUM SERPL-MCNC: 3.8 MMOL/L — SIGNIFICANT CHANGE UP (ref 3.5–5.3)
POTASSIUM SERPL-MCNC: 4.1 MMOL/L — SIGNIFICANT CHANGE UP (ref 3.5–5.3)
POTASSIUM SERPL-SCNC: 3.7 MMOL/L — SIGNIFICANT CHANGE UP (ref 3.5–5.3)
POTASSIUM SERPL-SCNC: 3.8 MMOL/L — SIGNIFICANT CHANGE UP (ref 3.5–5.3)
POTASSIUM SERPL-SCNC: 4.1 MMOL/L — SIGNIFICANT CHANGE UP (ref 3.5–5.3)
RBC # BLD: 4.17 M/UL — SIGNIFICANT CHANGE UP (ref 3.8–5.2)
RBC # FLD: 12.7 % — SIGNIFICANT CHANGE UP (ref 10.3–14.5)
SODIUM SERPL-SCNC: 132 MMOL/L — LOW (ref 135–145)
SODIUM SERPL-SCNC: 135 MMOL/L — SIGNIFICANT CHANGE UP (ref 135–145)
SODIUM SERPL-SCNC: 138 MMOL/L — SIGNIFICANT CHANGE UP (ref 135–145)
TIBC SERPL-MCNC: 252 UG/DL — SIGNIFICANT CHANGE UP (ref 220–430)
UIBC SERPL-MCNC: 211 UG/DL — SIGNIFICANT CHANGE UP (ref 110–370)
VIT B12 SERPL-MCNC: >2000 PG/ML — HIGH (ref 232–1245)
WBC # BLD: 4.82 K/UL — SIGNIFICANT CHANGE UP (ref 3.8–10.5)
WBC # FLD AUTO: 4.82 K/UL — SIGNIFICANT CHANGE UP (ref 3.8–10.5)

## 2020-10-27 PROCEDURE — 82803 BLOOD GASES ANY COMBINATION: CPT

## 2020-10-27 PROCEDURE — 85027 COMPLETE CBC AUTOMATED: CPT

## 2020-10-27 PROCEDURE — 83550 IRON BINDING TEST: CPT

## 2020-10-27 PROCEDURE — 96374 THER/PROPH/DIAG INJ IV PUSH: CPT

## 2020-10-27 PROCEDURE — 82746 ASSAY OF FOLIC ACID SERUM: CPT

## 2020-10-27 PROCEDURE — 84300 ASSAY OF URINE SODIUM: CPT

## 2020-10-27 PROCEDURE — 85014 HEMATOCRIT: CPT

## 2020-10-27 PROCEDURE — 70450 CT HEAD/BRAIN W/O DYE: CPT

## 2020-10-27 PROCEDURE — 73630 X-RAY EXAM OF FOOT: CPT

## 2020-10-27 PROCEDURE — 85018 HEMOGLOBIN: CPT

## 2020-10-27 PROCEDURE — 82435 ASSAY OF BLOOD CHLORIDE: CPT

## 2020-10-27 PROCEDURE — 83930 ASSAY OF BLOOD OSMOLALITY: CPT

## 2020-10-27 PROCEDURE — 87635 SARS-COV-2 COVID-19 AMP PRB: CPT

## 2020-10-27 PROCEDURE — 82330 ASSAY OF CALCIUM: CPT

## 2020-10-27 PROCEDURE — 96375 TX/PRO/DX INJ NEW DRUG ADDON: CPT

## 2020-10-27 PROCEDURE — 82728 ASSAY OF FERRITIN: CPT

## 2020-10-27 PROCEDURE — 99284 EMERGENCY DEPT VISIT MOD MDM: CPT | Mod: 25

## 2020-10-27 PROCEDURE — 99285 EMERGENCY DEPT VISIT HI MDM: CPT | Mod: 25

## 2020-10-27 PROCEDURE — 82607 VITAMIN B-12: CPT

## 2020-10-27 PROCEDURE — 99233 SBSQ HOSP IP/OBS HIGH 50: CPT | Mod: GC

## 2020-10-27 PROCEDURE — 84132 ASSAY OF SERUM POTASSIUM: CPT

## 2020-10-27 PROCEDURE — 83935 ASSAY OF URINE OSMOLALITY: CPT

## 2020-10-27 PROCEDURE — 99283 EMERGENCY DEPT VISIT LOW MDM: CPT

## 2020-10-27 PROCEDURE — 84295 ASSAY OF SERUM SODIUM: CPT

## 2020-10-27 PROCEDURE — 85025 COMPLETE CBC W/AUTO DIFF WBC: CPT

## 2020-10-27 PROCEDURE — 97161 PT EVAL LOW COMPLEX 20 MIN: CPT

## 2020-10-27 PROCEDURE — 83540 ASSAY OF IRON: CPT

## 2020-10-27 PROCEDURE — 71045 X-RAY EXAM CHEST 1 VIEW: CPT

## 2020-10-27 PROCEDURE — 81001 URINALYSIS AUTO W/SCOPE: CPT

## 2020-10-27 PROCEDURE — 83605 ASSAY OF LACTIC ACID: CPT

## 2020-10-27 PROCEDURE — 82947 ASSAY GLUCOSE BLOOD QUANT: CPT

## 2020-10-27 PROCEDURE — 84443 ASSAY THYROID STIM HORMONE: CPT

## 2020-10-27 PROCEDURE — 80053 COMPREHEN METABOLIC PANEL: CPT

## 2020-10-27 PROCEDURE — 80048 BASIC METABOLIC PNL TOTAL CA: CPT

## 2020-10-27 PROCEDURE — 93005 ELECTROCARDIOGRAM TRACING: CPT

## 2020-10-27 RX ORDER — ANASTROZOLE 1 MG/1
1 TABLET ORAL
Qty: 0 | Refills: 0 | DISCHARGE
Start: 2020-10-27

## 2020-10-27 RX ORDER — PANTOPRAZOLE SODIUM 20 MG/1
1 TABLET, DELAYED RELEASE ORAL
Qty: 14 | Refills: 0
Start: 2020-10-27 | End: 2020-11-09

## 2020-10-27 RX ORDER — LOSARTAN POTASSIUM 100 MG/1
1 TABLET, FILM COATED ORAL
Qty: 0 | Refills: 0 | DISCHARGE
Start: 2020-10-27

## 2020-10-27 RX ADMIN — ANASTROZOLE 1 MILLIGRAM(S): 1 TABLET ORAL at 13:26

## 2020-10-27 RX ADMIN — Medication 81 MILLIGRAM(S): at 11:40

## 2020-10-27 RX ADMIN — PANTOPRAZOLE SODIUM 40 MILLIGRAM(S): 20 TABLET, DELAYED RELEASE ORAL at 05:34

## 2020-10-27 NOTE — PROGRESS NOTE ADULT - ATTENDING COMMENTS
Kaylee Plummer MD, FACP, FACG, AGAF  [facility]  [phone]     After hours and weekend coverage GI service : [phone]
Karen Canela MD  Off: [phone]  Cell: [phone]
Hyponatremia: multifactorial etiology in the setting of nausea, vomiting, thiazide use, and increased intake of free water. Now off thiazide. Sodium improved with HTS. Will need follow up with PCP in as week of discharge for sodium level monitoring. Pt. counselled again to restrict free water and take high solute liquids.

## 2020-10-27 NOTE — PROGRESS NOTE ADULT - ASSESSMENT
70 female,   Hx: HTN ,  Ca  breast  2018,  s/p  lumpectomy/ path, invasive  ductal  cancer, on anastrazole    s/p   multiple   syncopal episodes, from orthostasis/ then improved   Ct   angio on 10/17/20, no  PE/  mlple small  nodules/ GOO, left upper lung/  f/p ct chest, 2  months. earnestine montgomery  echo,    normal  Ef  carotid  dopplers   with   69 %  stenosis, Left ica/  discussed  with  neuro, no  intervention/ on asa/ lipitor.  prt  to  f/p  with neuro     now  admitted with dehydration/ hyponatremia  and  pt restarted  her  diuretic  pt has np edema. appears euvolemic  hyponatremia  of  115, on arrival,  with low  chloride/  Sodium was  140  on 10/19/20     House renal eval  pt instructed   again, to stop  her diuretics   and  to  have  adequate  fluid intake  known to  card/HTN, on cozaar  on  dvt ppx  hyponatremia,  has   worsened, to  115 now/ rx  a s pe r  house renal/ spoke  with dr stacy del rosario yeaterday  hb of  9/  check rpt hb/  no rectal bleed/ gi eval       ec< from: CT Angio Chest w/ IV Cont (10.17.20 @ 02:45) >  IMPRESSION:  No evidence of main, lobar, or proximal segmental pulmonary artery embolism. Limited evaluation the distal segmental and subsegmental pulmonary arteries.  Small focal subpleural groundglass opacity in the left upper lobe measuring 1 cm, nonspecific, potentially infectious or inflammatory, although a follow-up chest CT in 6 months should be performed to document resolution or stability.    Several scattered solid lung nodules measuring under 6 mm, nonspecific. In a low risk patient, no routine follow-up is needed. In a high-risk patient, consider follow-up chest CT in 12 months.  < end of copied text >       rd< from: CT Head No Cont (10.17.20 @ 01:47) >  MPRESSION:  No acute intracranial hemorrhage, large territorial infarct, or mass effect.    < end of copied text >   70 female PMH Ca  breast  2018,  s/p  lumpectomy/ path, invasive  ductal  cancer, on anastrazole s/p   multiple   syncopal episodes, from orthostasis/ then improved with IVF.     Ct   angio on 10/17/20, no  PE/  mlple small  nodules/ GOO, left upper lung/  f/p ct chest, 2  months. earnestine montgomery  echo,    normal  EF. Carotid  dopplers   with   69 %  stenosis, Left ica/  discussed  with  neuro, no  intervention/ on asa/ lipitor.  prt  to  f/p  with neuro     now  admitted with dehydration/ hyponatremia  and  pt restarted  her  diuretic  pt has no edema. appears euvolemic. Hyponatremia  of  115, on arrival,  with low  chloride/  Sodium was  140  on 10/19/20   House renal eval. Patient instructed   again, to stop  her diuretics   and  to  have  adequate  fluid intake  known to  card/HTN, on cozaar. Stop all free water.     Plan: Sodium better today 132. If repeat continue to improve, discharge home no HCTZ.

## 2020-10-27 NOTE — DISCHARGE NOTE PROVIDER - CARE PROVIDER_API CALL
King Gaston)  Internal Medicine  [address] [place] [address] [id number]  Phone: [phone]  Fax: [phone]  Follow Up Time:

## 2020-10-27 NOTE — PROGRESS NOTE ADULT - ASSESSMENT
70F PMHx breast cancer, HTN who present to ED after fall at home - admitted for hyponatremia.  Nephrology following for hyponatremia.       # Hyponatremia (asymptomatic) likely multifactorial including thiazide and SIADH in setting of pain (concussion), nausea, vomiting, stress  On admission sNa 115, sOsm 244, UA specific gravity 1.025, started on hypertonic saline 2%, improved to 124 on 10/24, however decreased to 120 and to 113 yesterday (10/26/20). Patient initiated on 3% HTS with SNa increased to 124 yesterday night  and 134 this AM. Patient asymptomatic    Recommendations:  -Repeat BMP  - STOP HCTZ  - monitor serum K (goal >4), will help improve serum Na level  - If SNa rapidly correcting on repeat BMP with consider giving D5W (high risk of osmotic demyelination syndrome given age and diuretic)   - control nausea and headache if recurrent (currently not present)        #Hypertension, sBP 160s overnight  - please start amlodipine 2.5mg for better BP control, can titrate up if needed   - continue losartan 50mg daily for now, monitor BP (goal <140/80)    If you have any questions, please feel free to contact me  Selina Cisneros  Nephrology Fellow  Pager: [phone] / [phone]  (After 5pm or on weekends please page the on-call fellow)     70F PMHx breast cancer, HTN who present to ED after fall at home - admitted for hyponatremia.  Nephrology following for hyponatremia.       # Hyponatremia (asymptomatic) likely multifactorial including thiazide and SIADH in setting of pain (concussion), nausea, vomiting, stress  On admission sNa 115, sOsm 244, UA specific gravity 1.025, started on hypertonic saline 2%, improved to 124 on 10/24, however decreased to 120 and to 113 yesterday (10/26/20). Patient initiated on 3% HTS with SNa increased to 124 yesterday night  and 134 this AM. Patient asymptomatic    Recommendations:  -Repeat BMP  - STOP HCTZ  - monitor serum K (goal >4), will help improve serum Na level  - If SNa rapidly correcting on repeat BMP with consider giving D5W (high risk of osmotic demyelination syndrome given age and diuretic)   - control nausea and headache if recurrent (currently not present)        #Hypertension, sBP 160s overnight  - can uptitrate dose of losartan as needed, monitor BP (goal <140/80)    If you have any questions, please feel free to contact me  Selina Cisneros  Nephrology Fellow  Pager: [phone] / [phone]  (After 5pm or on weekends please page the on-call fellow)

## 2020-10-27 NOTE — PROGRESS NOTE ADULT - ASSESSMENT
70F with PMHx of breast cancer, hypertension, ductal carcinoma of breast, and moderate carotid stenosis presents for two day history of nausea, vomiting, and decrease PO intake. One week prior patient had a syncopal event with no prodromal syndromes. She had unremarkable CTH, CTA Chest, TTE, and found to have moderate internal carotid stenosis of left and started on aspirin and statin    Anemia - without overt/brisk GI bleed; reported negative colonoscopy 1 year ago.  Possible lab error yesterday.    Severe hyponatremia - renal following      RECS:  -Continue Pantoprazole  -Monitor BMs, CBC  -Monitor lytes  -No role for invasive GI evaluation at this time

## 2020-10-27 NOTE — PROGRESS NOTE ADULT - SUBJECTIVE AND OBJECTIVE BOX
INTERVAL HPI/OVERNIGHT EVENTS:  Pt seen and examined at bedside.     Allergies/Intolerance: Cipro (Rash)  penicillin (Rash)      MEDICATIONS  (STANDING):  anastrozole 1 milliGRAM(s) Oral daily  aspirin  chewable 81 milliGRAM(s) Oral daily  atorvastatin 10 milliGRAM(s) Oral at bedtime  enoxaparin Injectable 40 milliGRAM(s) SubCutaneous daily  losartan 50 milliGRAM(s) Oral daily  pantoprazole    Tablet 40 milliGRAM(s) Oral before breakfast    MEDICATIONS  (PRN):  acetaminophen   Tablet .. 650 milliGRAM(s) Oral every 6 hours PRN Temp greater or equal to 38C (100.4F), Mild Pain (1 - 3), Moderate Pain (4 - 6), Severe Pain (7 - 10)  benzonatate 100 milliGRAM(s) Oral three times a day PRN Cough        ROS: all systems reviewed and wnl      PHYSICAL EXAMINATION:  Vital Signs Last 24 Hrs  T(C): 36.7 (27 Oct 2020 05:07), Max: 36.9 (26 Oct 2020 13:17)  T(F): 98 (27 Oct 2020 05:07), Max: 98.4 (26 Oct 2020 13:17)  HR: 75 (27 Oct 2020 05:07) (75 - 87)  BP: 145/76 (27 Oct 2020 05:07) (120/75 - 166/93)  BP(mean): --  RR: 18 (27 Oct 2020 05:07) (18 - 18)  SpO2: 97% (27 Oct 2020 05:07) (97% - 98%)  CAPILLARY BLOOD GLUCOSE          10-26 @ 07:01  -  10-27 @ 07:00  --------------------------------------------------------  IN: 1140 mL / OUT: 3350 mL / NET: -2210 mL        GENERAL:   NECK: supple, No JVD  CHEST/LUNG: clear to auscultation bilaterally; no rales, rhonchi, or wheezing b/l  HEART: normal S1, S2  ABDOMEN: BS+, soft, ND, NT   EXTREMITIES:  pulses palpable; no clubbing, cyanosis, or edema b/l LEs  SKIN: no rashes or lesions      LABS:                        11.6   4.82  )-----------( 238      ( 27 Oct 2020 07:19 )             34.7     10-27    132<L>  |  98  |  6<L>  ----------------------------<  81  3.7   |  25  |  0.54    Ca    9.7      27 Oct 2020 07:19                 INTERVAL HPI/OVERNIGHT EVENTS:  Pt seen and examined at bedside.     Allergies/Intolerance: Cipro (Rash)  penicillin (Rash)      MEDICATIONS  (STANDING):  anastrozole 1 milliGRAM(s) Oral daily  aspirin  chewable 81 milliGRAM(s) Oral daily  atorvastatin 10 milliGRAM(s) Oral at bedtime  enoxaparin Injectable 40 milliGRAM(s) SubCutaneous daily  losartan 50 milliGRAM(s) Oral daily  pantoprazole    Tablet 40 milliGRAM(s) Oral before breakfast    MEDICATIONS  (PRN):  acetaminophen   Tablet .. 650 milliGRAM(s) Oral every 6 hours PRN Temp greater or equal to 38C (100.4F), Mild Pain (1 - 3), Moderate Pain (4 - 6), Severe Pain (7 - 10)  benzonatate 100 milliGRAM(s) Oral three times a day PRN Cough        ROS: all systems reviewed and wnl      PHYSICAL EXAMINATION:  Vital Signs Last 24 Hrs  T(C): 36.7 (27 Oct 2020 05:07), Max: 36.9 (26 Oct 2020 13:17)  T(F): 98 (27 Oct 2020 05:07), Max: 98.4 (26 Oct 2020 13:17)  HR: 75 (27 Oct 2020 05:07) (75 - 87)  BP: 145/76 (27 Oct 2020 05:07) (120/75 - 166/93)  BP(mean): --  RR: 18 (27 Oct 2020 05:07) (18 - 18)  SpO2: 97% (27 Oct 2020 05:07) (97% - 98%)  CAPILLARY BLOOD GLUCOSE          10-26 @ 07:01  -  10-27 @ 07:00  --------------------------------------------------------  IN: 1140 mL / OUT: 3350 mL / NET: -2210 mL        GENERAL: stable, eager for discharge.   NECK: supple, No JVD  CHEST/LUNG: clear to auscultation bilaterally; no rales, rhonchi, or wheezing b/l  HEART: normal S1, S2  ABDOMEN: BS+, soft, ND, NT   EXTREMITIES:  pulses palpable; no clubbing, cyanosis, or edema b/l LEs  SKIN: no rashes or lesions      LABS:                        11.6   4.82  )-----------( 238      ( 27 Oct 2020 07:19 )             34.7     10-27    132<L>  |  98  |  6<L>  ----------------------------<  81  3.7   |  25  |  0.54    Ca    9.7      27 Oct 2020 07:19

## 2020-10-27 NOTE — PROGRESS NOTE ADULT - REASON FOR ADMISSION
Headache/Syncope

## 2020-10-27 NOTE — DISCHARGE NOTE PROVIDER - HOSPITAL COURSE
70F PMHx breast cancer, HTN who present to ED after fall at home - admitted for hyponatremia.  Nephrology following for hyponatremia.     # Hyponatremia (asymptomatic) likely multifactorial including thiazide and SIADH in setting of pain (concussion), nausea, vomiting, stress  On admission sNa 115, sOsm 244, UA specific gravity 1.025, started on hypertonic saline 2%, improved to 124 on 10/24, however decreased to 120 and to 113 yesterday (10/26/20). Patient initiated on 3% HTS with SNa increased to 124 on 10/26(evening), now 132 on 10/27. Patient asymptomatic. Repeat Sna at 1600 on 10/27----------  - HCTZ held during hospitalization, discussed with pt to cont to hold hctz and f/u with her doctor.   - losartan for HTN   - Pt medically cleared for discharge to home               70F PMHx breast cancer, HTN who present to ED after fall at home - admitted for hyponatremia.  Nephrology following for hyponatremia.     # Hyponatremia (asymptomatic) likely multifactorial including thiazide and SIADH in setting of pain (concussion), nausea, vomiting, stress  On admission sNa 115, sOsm 244, UA specific gravity 1.025, started on hypertonic saline 2%, improved to 124 on 10/24, however decreased to 120 and to 113 yesterday (10/26/20). Patient initiated on 3% HTS with SNa increased to 124 on 10/26(evening), now 132 on 10/27. Patient asymptomatic. Repeat Sna at 1600 on 10/27----------  - HCTZ held during hospitalization, discussed with pt to cont to hold hctz and f/u with her doctor.   - losartan for HTN   - Pt medically cleared for discharge to home             70F PMHx breast cancer, HTN who present to ED after fall at home - admitted for hyponatremia.  Nephrology following for hyponatremia.     # Hyponatremia (asymptomatic) likely multifactorial including thiazide and SIADH in setting of pain (concussion), nausea, vomiting, stress  On admission sNa 115, sOsm 244, UA specific gravity 1.025, started on hypertonic saline 2%, improved to 124 on 10/24, however decreased to 120 and to 113 yesterday (10/26/20). Patient initiated on 3% HTS with SNa increased to 124 on 10/26(evening), now 132 on 10/27. Patient asymptomatic. Repeat Sna at 1600 on 10/27 135.  - HCTZ held during hospitalization, discussed with pt to cont to hold hctz and f/u with her doctor.   - losartan for HTN   - Pt medically cleared for discharge to home             70 year old female PMH breast cancer, HTN who present to ED after fall at home. Had hyponatremia on arrival.  Nephrology following for hyponatremia.   Hyponatremia (asymptomatic) likely multifactorial including thiazide and SIADH in setting of pain (concussion), nausea, vomiting, stress. Does admit to drinking  free water at home.      On admission Na low at 113. Responded well to hypertonic saline 2%, improved to 124 on 10/24, however decreased to 120 and to 113 yesterday (10/26/20). Patient initiated on 3% Saline, Na increased to 124 on 10/26(evening), now 132 on 10/27. At discharge was 135. HCTZ held during hospitalization, discussed with pt to cont to hold HCTZ and f/u with her doctor in one week for repeat labs. Losartan for HTN to continue, COVID-19 negative. CBC, CXR all clear. CT head admission one week ago all normal. See attached med list. N infections present. 70 year old female PMH breast cancer, HTN who present to ED after fall at home. Had hyponatremia on arrival.  Nephrology following for hyponatremia.   Hyponatremia (asymptomatic) likely multifactorial including thiazide and SIADH in setting of pain (concussion), nausea, vomiting, stress. Does admit to drinking  free water at home.      On admission Na low at 113. Responded well to hypertonic saline 2%, improved to 124 on 10/24, however decreased to 120 and to 113 yesterday (10/26/20). Patient initiated on 3% Saline, Na increased to 124 on 10/26(evening), now 132 on 10/27. At discharge was 135. HCTZ held during hospitalization, discussed with pt to cont to hold HCTZ and f/u with her doctor in one week for repeat labs. Losartan for HTN to continue, COVID-19 negative. CBC, CXR all clear. CT head admission one week ago all normal. See attached med list. No infections present. 70 year old female PMH breast cancer, HTN who present to ED after fall at home. Had hyponatremia on arrival.  Nephrology following for hyponatremia.  Hyponatremia (asymptomatic) likely multifactorial including thiazide and SIADH in setting of pain (concussion), nausea, vomiting, stress. Does admit to drinking  free water at home.      On admission Na low at 113. Responded well to hypertonic saline 2%, improved to 124 on 10/24, however decreased to 120 and to 113 yesterday (10/26/20). Patient initiated on 3% Saline, Na increased to 124 on 10/26(evening), now 132 on 10/27.     At discharge Sodium was 135. HCTZ held during hospitalization, discussed with pt to cont to hold HCTZ and f/u with her doctor in one week for repeat labs. Losartan for HTN to continue, COVID-19 negative. CBC, CXR all clear. CT head admission one week ago all normal. See attached med list. No infections present. 70 year old female PMH breast cancer, HTN who present to ED after fall at home. Had hyponatremia on arrival.  Nephrology following for hyponatremia.  Hyponatremia (asymptomatic) likely multifactorial including thiazide and SIADH in setting of pain (concussion), nausea, vomiting, stress. Does admit to drinking  free water at home.      On admission Na low at 113. Responded well to hypertonic saline 2%, improved to 124 on 10/24, however decreased to 120 and to 113 yesterday (10/26/20). Patient initiated on 3% Saline, Na increased to 124 on 10/26(evening), now 132 on 10/27.     At discharge Sodium was 135. HCTZ held during hospitalization, discussed with pt to cont to hold HCTZ and f/u with her doctor in one week for repeat labs. Losartan for HTN to continue, COVID-19 negative. CBC, CXR all clear. CT head admission one week ago all normal. See attached med list. No infections present.     Addendum:  Alkalotic PH on arrival was not significant.

## 2020-10-27 NOTE — PROGRESS NOTE ADULT - SUBJECTIVE AND OBJECTIVE BOX
CARDIOLOGY     PROGRESS  NOTE   ________________________________________________    CHIEF COMPLAINT:Patient is a 70y old  Female who presents with a chief complaint of Headache/Syncope (26 Oct 2020 14:34)  no complain.  	  REVIEW OF SYSTEMS:  CONSTITUTIONAL: No fever, weight loss, or fatigue  EYES: No eye pain, visual disturbances, or discharge  ENT:  No difficulty hearing, tinnitus, vertigo; No sinus or throat pain  NECK: No pain or stiffness  RESPIRATORY: No cough, wheezing, chills or hemoptysis; No Shortness of Breath  CARDIOVASCULAR: No chest pain, palpitations, passing out, dizziness, or leg swelling  GASTROINTESTINAL: No abdominal or epigastric pain. No nausea, vomiting, or hematemesis; No diarrhea or constipation. No melena or hematochezia.  GENITOURINARY: No dysuria, frequency, hematuria, or incontinence  NEUROLOGICAL: No headaches, memory loss, loss of strength, numbness, or tremors  SKIN: No itching, burning, rashes, or lesions   LYMPH Nodes: No enlarged glands  ENDOCRINE: No heat or cold intolerance; No hair loss  MUSCULOSKELETAL: No joint pain or swelling; No muscle, back, or extremity pain  PSYCHIATRIC: No depression, anxiety, mood swings, or difficulty sleeping  HEME/LYMPH: No easy bruising, or bleeding gums  ALLERGY AND IMMUNOLOGIC: No hives or eczema	    [ ] All others negative	  [ ] Unable to obtain    PHYSICAL EXAM:  T(C): 36.7 (10-27-20 @ 05:07), Max: 36.9 (10-26-20 @ 13:17)  HR: 75 (10-27-20 @ 05:07) (75 - 87)  BP: 145/76 (10-27-20 @ 05:07) (120/75 - 166/93)  RR: 18 (10-27-20 @ 05:07) (18 - 18)  SpO2: 97% (10-27-20 @ 05:07) (97% - 98%)  Wt(kg): --  I&O's Summary    26 Oct 2020 07:01  -  27 Oct 2020 07:00  --------------------------------------------------------  IN: 1140 mL / OUT: 3350 mL / NET: -2210 mL        Appearance: Normal	  HEENT:   Normal oral mucosa, PERRL, EOMI	  Lymphatic: No lymphadenopathy  Cardiovascular: Normal S1 S2, No JVD, + murmurs, No edema  Respiratory: Lungs clear to auscultation	  Psychiatry: A & O x 3, Mood & affect appropriate  Gastrointestinal:  Soft, Non-tender, + BS	  Skin: No rashes, No ecchymoses, No cyanosis	  Neurologic: Non-focal  Extremities: Normal range of motion, No clubbing, cyanosis or edema  Vascular: Peripheral pulses palpable 2+ bilaterally    MEDICATIONS  (STANDING):  anastrozole 1 milliGRAM(s) Oral daily  aspirin  chewable 81 milliGRAM(s) Oral daily  atorvastatin 10 milliGRAM(s) Oral at bedtime  enoxaparin Injectable 40 milliGRAM(s) SubCutaneous daily  losartan 50 milliGRAM(s) Oral daily  pantoprazole    Tablet 40 milliGRAM(s) Oral before breakfast      TELEMETRY: 	    ECG:  	  RADIOLOGY:  OTHER: 	  	  LABS:	 	    CARDIAC MARKERS:                                11.6   4.82  )-----------( 238      ( 27 Oct 2020 07:19 )             34.7     10-26    124<L>  |  91<L>  |  6<L>  ----------------------------<  98  3.7   |  25  |  0.57    Ca    9.1      26 Oct 2020 20:37      proBNP: Serum Pro-Brain Natriuretic Peptide: 140 pg/mL (10-17 @ 01:31)    Lipid Profile: Cholesterol 207  LDL 98  HDL 91  TG 93    HgA1c:   TSH: Thyroid Stimulating Hormone, Serum: 3.38 uIU/mL (10-24 @ 03:30)  Thyroid Stimulating Hormone, Serum: 1.16 uIU/mL (10-18 @ 09:27)     HOLD thiazide  - resume hypertonic saline  - monitor serum K (goal >4), will help improve serum Na level  - monitor BMP every 4 hours for now, check urine osmolality q12hr  - avoid rapid correction of serum Na, correction goal 6-8 mEq/24hrs (high risk of osmotic demyelination syndrome given age and diuretic)   - control nausea and headache if recurrent (currently not present)        Assessment and plan  ---------------------------  70F with PMHx of breast cancer, hypertension, ductal carcinoma of breast, and moderate carotid stenosis presents for two day history of nausea, vomiting, and decrease PO intake. One week prior patient had a syncopal event with no prodromal syndromes. She had unremarkable CTH, CTA Chest, TTE, and found to have moderate internal carotid stenosis of left and started on aspirin and statin. Since then she has been feeling unwell. She has had loss of appetite because she feels nauseous each time she eats. No syncopal episodes since. States her BP has been usually elevated. She thought it was because of hypertension so she took 25 mg of HCTZ yesterday. She presented to the ED yesterday twice and had an unremarkable CT head. No blood work was done. Patient denies over the counter supplements. She does not partake in ectasy. No blood work drawn in past several days. Today patient given 1 L LR and found to have Na of 110 on VBG and 115 on BMP. Patient started on 2% NS at 50 cc/hr. When seen by me in ED she has no complaints. She states her urinary output as decreased which she thinks is because of lack of appetite.    pt with uncontrolled htn, hyponatremia and possible syncope.  pt was seen by me on the last admission with the same complain and found to be orthostatic sec to dehydration and decrease po intake.  will adjust bp meds  tsh  renal appreciated  check orthostatic negative  am cortisol level  still hyponatremic as per renal  dc Norvasc with hx of orthostatic hypotension, still awaiting orthostatic numbers  bp is better controlled

## 2020-10-27 NOTE — PROGRESS NOTE ADULT - SUBJECTIVE AND OBJECTIVE BOX
[facility] Division of Kidney Diseases & Hypertension  FOLLOW UP NOTE  [phone]--------------------------------------------------------------------------------  24 hour events/subjective:  - Seen this morning. Patient states that she feels great, she was able to sleep well last night. She denies having any headaches, dizziness, nausea/vomiting, lightheadedness today  - Patient was started on 3% HTS yesterday. Sna increased to 124 then to 132 this AM  - UO: 3350 over the past 24 hours  - No other acute overnight events      PAST HISTORY  --------------------------------------------------------------------------------  No significant changes to PMH, PSH, FHx, SHx, unless otherwise noted    ALLERGIES & MEDICATIONS  --------------------------------------------------------------------------------  Allergies    Cipro (Rash)  penicillin (Rash)    Intolerances      Standing Inpatient Medications  anastrozole 1 milliGRAM(s) Oral daily  aspirin  chewable 81 milliGRAM(s) Oral daily  atorvastatin 10 milliGRAM(s) Oral at bedtime  enoxaparin Injectable 40 milliGRAM(s) SubCutaneous daily  losartan 50 milliGRAM(s) Oral daily  pantoprazole    Tablet 40 milliGRAM(s) Oral before breakfast    PRN Inpatient Medications  acetaminophen   Tablet .. 650 milliGRAM(s) Oral every 6 hours PRN  benzonatate 100 milliGRAM(s) Oral three times a day PRN    REVIEW OF SYSTEMS  --------------------------------------------------------------------------------  Gen: no fever, chills, weakness  Respiratory: No dyspnea, cough  CV: No chest pain, orthopnea  GI: No abdominal pain, nausea, vomiting, diarrhea  MSK: no edema  Neuro: No dizziness, lightheadedness  Heme: No bleeding  All other systems were reviewed and are negative, except as noted.      VITALS/PHYSICAL EXAM  --------------------------------------------------------------------------------  T(C): 36.7 (10-27-20 @ 05:07), Max: 36.9 (10-26-20 @ 13:17)  HR: 75 (10-27-20 @ 05:07) (75 - 87)  BP: 145/76 (10-27-20 @ 05:07) (120/75 - 166/93)  RR: 18 (10-27-20 @ 05:07) (18 - 18)  SpO2: 97% (10-27-20 @ 05:07) (97% - 98%)  Wt(kg): --    10-26-20 @ 07:01  -  10-27-20 @ 07:00  --------------------------------------------------------  IN: 1140 mL / OUT: 3350 mL / NET: -2210 mL    Physical Exam:  	Gen: NAD, well-appearing on room air  	HEENT: MMM  	Pulm: CTA B/L, no crackles  	CV: RRR, S1S2; no rub/murmur  	GI: +BS, soft, nontender/nondistended  	: No suprapubic tenderness  	MSK: Warm, no edema              Neuro: AAOx3  	Skin: Warm    LABS/STUDIES  --------------------------------------------------------------------------------              11.6   4.82  >-----------<  238      [10-27-20 @ 07:19]              34.7     132  |  98  |  6   ----------------------------<  81      [10-27-20 @ 07:19]  3.7   |  25  |  0.54        Ca     9.7     [10-27-20 @ 07:19]      Creatinine Trend:  SCr 0.54 [10-27 @ 07:19]  SCr 0.57 [10-26 @ 20:37]  SCr 0.39 [10-26 @ 13:16]  SCr 0.42 [10-26 @ 06:52]  SCr 0.56 [10-25 @ 21:45]    Urinalysis - [10-23-20 @ 22:42]      Color Yellow / Appearance Clear / SG 1.025 / pH 6.5      Gluc Negative / Ketone Large  / Bili Negative / Urobili Negative       Blood Negative / Protein Trace / Leuk Est Negative / Nitrite Negative      RBC 4 / WBC 5 / Hyaline 0 / Gran  / Sq Epi  / Non Sq Epi 1 / Bacteria Negative    Urine Sodium 211      [10-26-20 @ 10:05]  Urine Osmolality 576      [10-26-20 @ 11:45]    TSH 3.38      [10-24-20 @ 03:30]

## 2020-10-27 NOTE — PROGRESS NOTE ADULT - SUBJECTIVE AND OBJECTIVE BOX
Patient is a 70y old  Female who presented with a chief complaint of Headache/Syncope (27 Oct 2020 08:51)    INTERVAL HPI/OVERNIGHT EVENTS: None    MEDICATIONS  (STANDING):  anastrozole 1 milliGRAM(s) Oral daily  aspirin  chewable 81 milliGRAM(s) Oral daily  atorvastatin 10 milliGRAM(s) Oral at bedtime  enoxaparin Injectable 40 milliGRAM(s) SubCutaneous daily  losartan 50 milliGRAM(s) Oral daily  pantoprazole    Tablet 40 milliGRAM(s) Oral before breakfast    MEDICATIONS  (PRN):  acetaminophen   Tablet .. 650 milliGRAM(s) Oral every 6 hours PRN Temp greater or equal to 38C (100.4F), Mild Pain (1 - 3), Moderate Pain (4 - 6), Severe Pain (7 - 10)    Allergies  Cipro (Rash)  penicillin (Rash)    Review of Systems:  General:  No wt loss, fevers, chills, night sweats  CV:  No pain, palpitations, +HTN  Resp:  No dyspnea, cough, tachypnea, wheezing  GI: see HPI  :  No pain, bleeding, +hyponatremia - Renal following  Muscle:  No pain, weakness  Neuro:  No weakness, tingling, memory problems  Psych:  No fatigue, insomnia, mood problems, depression  Endocrine:  No polyuria, polydypsia, cold/heat intolerance  Heme:  No petechiae, ecchymosis, easy bruisability  Skin:  No rash, tattoos, scars, edema    Vital Signs Last 24 Hrs  T(C): 36.7 (27 Oct 2020 09:24), Max: 36.9 (26 Oct 2020 13:17)  T(F): 98 (27 Oct 2020 09:24), Max: 98.4 (26 Oct 2020 13:17)  HR: 86 (27 Oct 2020 09:24) (75 - 87)  BP: 123/71 (27 Oct 2020 09:24) (120/75 - 166/93)  BP(mean): --  RR: 18 (27 Oct 2020 09:24) (18 - 18)  SpO2: 91% (27 Oct 2020 09:24) (91% - 98%)    PHYSICAL EXAM:  Constitutional: NAD, well-developed non toxic appearing  Neck: No LAD, supple no JVD  Respiratory: CTA and P  Cardiovascular: S1 and S2, RRR  Gastrointestinal: BS+, soft, NT/ND, neg HSM,  Extremities: No peripheral edema, neg clubbing, cyanosis  Vascular: 2+ peripheral pulses  Neurological: A/O x 3, no focal deficits  Psychiatric: Normal mood, normal affect  Skin: No rashes, anicteric    LABS:                      11.6   4.82  )-----------( 238      ( 27 Oct 2020 07:19 )             34.7   Hemoglobin: 11.6 g/dL [11.5 - 15.5] (10-27-20 @ 07:19)  Hemoglobin: 9.9 g/dL <L> [11.5 - 15.5] (10-26-20 @ 06:58)               9.9    6.47  )-----------( 184      ( 26 Oct 2020 06:58 )             28.9   Mean Cell Volume: 80.1 fl (10.26.20 @ 06:58)   Hemoglobin: 9.9 g/dL (10.26.20 @ 06:58)   Hemoglobin: 11.0 g/dL (10.24.20 @ 02:41)   Hemoglobin: 12.7 g/dL (10.23.20 @ 19:03)   Hemoglobin: 11.4 g/dL (10.19.20 @ 06:00)   Thyroid Stimulating Hormone, Serum: 3.38 uIU/mL (10.24.20 @ 03:30)     10-27    132<L>  |  98  |  6<L>  ----------------------------<  81  3.7   |  25  |  0.54    Ca    9.7      27 Oct 2020 07:19    LIVER FUNCTIONS - ( 23 Oct 2020 19:03 )  Alb: 4.2 g/dL / Pro: 7.6 g/dL / ALK PHOS: 66 U/L / ALT: 28 U/L / AST: 37 U/L / GGT: x         Iron with Total Binding Capacity in AM (10.27.20 @ 10:03)   Iron - Total Binding Capacity.: 252 ug/dL   % Saturation, Iron: 16 %   Iron Total, Serum: 41 ug/dL   Unsaturated Iron Binding Capacity: 211 ug/dL   Ferritin, Serum in AM (10.27.20 @ 10:05)   Ferritin, Serum: 91 ng/mL   RADIOLOGY & ADDITIONAL TESTS:

## 2020-10-27 NOTE — DISCHARGE NOTE PROVIDER - NSDCCPCAREPLAN_GEN_ALL_CORE_FT
PRINCIPAL DISCHARGE DIAGNOSIS  Diagnosis: Hyponatremia  Assessment and Plan of Treatment: Hydrochlorthiazide held during hospitalization.  Seen by Renal- sodium improved.   Follow up with your doctor      SECONDARY DISCHARGE DIAGNOSES  Diagnosis: Hypertension  Assessment and Plan of Treatment: Continue with Losartan as prescribed     PRINCIPAL DISCHARGE DIAGNOSIS  Diagnosis: Hyponatremia  Assessment and Plan of Treatment: hydrochlorothiazide held during hospitalization.  Seen by Renal- sodium improved.   Follow up with your doctor      SECONDARY DISCHARGE DIAGNOSES  Diagnosis: Hypertension  Assessment and Plan of Treatment: Continue with Losartan as prescribed     PRINCIPAL DISCHARGE DIAGNOSIS  Diagnosis: Hyponatremia  Assessment and Plan of Treatment: hydrochlorothiazide held during hospitalization.  Seen by Renal- sodium improved.   Follow up with your Primary care doctor in one week of discharge for sodium level monitoring.      SECONDARY DISCHARGE DIAGNOSES  Diagnosis: Hypertension  Assessment and Plan of Treatment: Continue with Losartan as prescribed

## 2020-10-27 NOTE — DISCHARGE NOTE PROVIDER - NSDCMRMEDTOKEN_GEN_ALL_CORE_FT
anastrozole 1 mg oral tablet: 1 tab(s) orally once a day  aspirin 81 mg oral tablet, chewable: 1 tab(s) orally once a day  atorvastatin 10 mg oral tablet: 1 tab(s) orally once a day (at bedtime)  losartan 50 mg oral tablet: 1 tab(s) orally once a day  pantoprazole 40 mg oral delayed release tablet: 1 tab(s) orally once a day (before a meal)

## 2020-10-27 NOTE — DISCHARGE NOTE NURSING/CASE MANAGEMENT/SOCIAL WORK - PATIENT PORTAL LINK FT
You can access the FollowMyHealth Patient Portal offered by [facility] by registering at the following website: [email or web address]. By joining [facility]’s FollowMyHealth portal, you will also be able to view your health information using other applications (apps) compatible with our system.

## 2020-10-29 PROBLEM — C50.919 MALIGNANT NEOPLASM OF UNSPECIFIED SITE OF UNSPECIFIED FEMALE BREAST: Chronic | Status: ACTIVE | Noted: 2020-10-23

## 2021-04-29 ENCOUNTER — APPOINTMENT (OUTPATIENT)
Dept: VASCULAR SURGERY | Facility: CLINIC | Age: 71
End: 2021-04-29
Payer: COMMERCIAL

## 2021-04-29 VITALS
BODY MASS INDEX: 20.33 KG/M2 | HEIGHT: 65 IN | SYSTOLIC BLOOD PRESSURE: 159 MMHG | TEMPERATURE: 98.1 F | HEART RATE: 71 BPM | DIASTOLIC BLOOD PRESSURE: 85 MMHG | WEIGHT: 122 LBS

## 2021-04-29 DIAGNOSIS — Z78.9 OTHER SPECIFIED HEALTH STATUS: ICD-10-CM

## 2021-04-29 PROCEDURE — 99072 ADDL SUPL MATRL&STAF TM PHE: CPT

## 2021-04-29 PROCEDURE — 93880 EXTRACRANIAL BILAT STUDY: CPT

## 2021-04-29 PROCEDURE — 99213 OFFICE O/P EST LOW 20 MIN: CPT

## 2021-04-29 RX ORDER — ASPIRIN 81 MG
81 TABLET, DELAYED RELEASE (ENTERIC COATED) ORAL
Refills: 0 | Status: ACTIVE | COMMUNITY

## 2021-04-29 RX ORDER — ATORVASTATIN CALCIUM 20 MG/1
20 TABLET, FILM COATED ORAL
Refills: 0 | Status: ACTIVE | COMMUNITY

## 2021-04-29 NOTE — END OF VISIT
Patient requesting refill(s) of: zofran 4 mg take one tab Q8H PRN    Last filled:11/16/2020 #20 x 0  Last appt:1/8/21  Next appt:5/11/21  Ph[facility]acy: [facility] [Time Spent: ___ minutes] : I have spent [unfilled] minutes of time on the encounter.

## 2021-04-29 NOTE — ASSESSMENT
[FreeTextEntry1] : SHAYLA RODRIGUEZ is a 70 year old female with history of left breast cancer status post lumpectomy and radiation, currently on anastrozole, and hypertension presents for evaluation of carotid stenosis.\par \par > Asymptomatic left moderate grade carotid stenosis\par –Patient has history of radiation therapy to left breast. Question possible etiology of left carotid stenosis secondary to radiation therapy although appears radiation area would be far removed from left carotid artery. Discussed this with Dr. Stewart, who will discuss with Dr. Palmer (rad-onc) regarding possibility. \par –As patient remains asymptomatic, there is no indication for surgical intervention at this time.  Recommend continued use of aspirin and statin with control of blood pressure.\par –Follow-up in 6 months for repeat carotid duplex.

## 2021-04-29 NOTE — DATA REVIEWED
[FreeTextEntry1] : 4/29/2021–carotid duplex\par Right–no stenosis in the internal carotid artery.\par Left–moderate stenosis (50 to 69%).\par Antegrade flow in the bilateral vertebral arteries.

## 2021-04-29 NOTE — HISTORY OF PRESENT ILLNESS
[FreeTextEntry1] : SHAYLA RODRIGUEZ is a 70 year old female who presents for evaluation of carotid stenosis.\par \par Patient was admitted in October 2020 for syncope and nausea [facility].  During her evaluation, she underwent a carotid duplex, which was significant for left-sided moderate (50 to 69%) stenosis.  She has no prior history of CVA or TIA.  She was started on aspirin and statin.  Her hospital course was also significant for hyponatremia, for which she was treated.  She was also evaluated by neurology, and was found to have no neurological event.\par \par Since her discharge, the patient has been feeling well.  She denies any symptoms of CVA or TIA.  She denies any unilateral weakness or numbness.  Denies any history of slurred speech or facial droop.  No history of amaurosis fugax.  She continues to take aspirin 81 mg and statin.\par \par Medical history is significant for left-sided breast cancer status post lumpectomy and sentinel node biopsy.  She underwent adjuvant radiation therapy, which he stopped 2 years ago.  She is currently remaining on anastrozole under the care of Dr. Alcazar.\par \par \par + Breast cancer status post lumpectomy and radiation currently on anastrozole, hypertension\par PCP is Dr. King Gaston.\par Oncologist is Dr. Atiya Alcazar.

## 2021-04-29 NOTE — CONSULT LETTER
[Dear  ___] : Dear  [unfilled], [Consult Letter:] : I had the pleasure of evaluating your patient, [unfilled]. [Please see my note below.] : Please see my note below. [Consult Closing:] : Thank you very much for allowing me to participate in the care of this patient.  If you have any questions, please do not hesitate to contact me. [Sincerely,] : Sincerely, [FreeTextEntry1] : She was hospitalized in October 2020 after syncope.  During the work-up, she was found to have an asymptomatic moderate grade stenosis of her left internal carotid artery.  During that time, I recommended starting the patient on aspirin 81 mg and a statin.  As she remains asymptomatic, there is no indication for revascularization at this time.  I recommend continued use of aspirin 81 mg and statin. [FreeTextEntry3] : \par \par \par \par Anahy Vale MD, RPVI\par Division of Vascular & Endovascular Surgery\par , Department of Surgery

## 2021-04-29 NOTE — PHYSICAL EXAM
[Normal Breath Sounds] : Normal breath sounds [Respiratory Effort] : normal respiratory effort [Normal Heart Sounds] : normal heart sounds [Normal Rate and Rhythm] : normal rate and rhythm [No Rash or Lesion] : No rash or lesion [Calm] : calm [JVD] : no jugular venous distention  [Carotid Bruits] : no carotid bruits [de-identified] : Well-appearing  [de-identified] : Insert I, CN II to XII grossly normal [de-identified] : Supple [de-identified] : Soft, nontender, nondistended  [de-identified] : Motor and sensory intact in all 4 extremities [de-identified] : Alert and oriented x4

## 2021-10-18 ENCOUNTER — TRANSCRIPTION ENCOUNTER (OUTPATIENT)
Age: 71
End: 2021-10-18

## 2021-10-18 ENCOUNTER — NON-APPOINTMENT (OUTPATIENT)
Age: 71
End: 2021-10-18

## 2021-11-09 ENCOUNTER — APPOINTMENT (OUTPATIENT)
Dept: VASCULAR SURGERY | Facility: CLINIC | Age: 71
End: 2021-11-09
Payer: COMMERCIAL

## 2021-11-09 VITALS
HEART RATE: 78 BPM | DIASTOLIC BLOOD PRESSURE: 82 MMHG | WEIGHT: 123 LBS | BODY MASS INDEX: 20.49 KG/M2 | TEMPERATURE: 97.3 F | HEIGHT: 65 IN | SYSTOLIC BLOOD PRESSURE: 158 MMHG

## 2021-11-09 PROCEDURE — 99213 OFFICE O/P EST LOW 20 MIN: CPT

## 2021-11-09 PROCEDURE — 93880 EXTRACRANIAL BILAT STUDY: CPT

## 2021-11-09 NOTE — HISTORY OF PRESENT ILLNESS
[FreeTextEntry1] : SHAYLA RODRIGUEZ is a 70 year old female who presents for evaluation of carotid stenosis.\par \par Patient was admitted in October 2020 for syncope and nausea [facility].  During her evaluation, she underwent a carotid duplex, which was significant for left-sided moderate (50 to 69%) stenosis.  She has no prior history of CVA or TIA.  She was started on aspirin and statin.  Her hospital course was also significant for hyponatremia, for which she was treated.  She was also evaluated by neurology, and was found to have no neurological event.\par \par Since her discharge, the patient has been feeling well.  She denies any symptoms of CVA or TIA.  She denies any unilateral weakness or numbness.  Denies any history of slurred speech or facial droop.  No history of amaurosis fugax.  She continues to take aspirin 81 mg and statin.\par \par Medical history is significant for left-sided breast cancer status post lumpectomy and sentinel node biopsy.  She underwent adjuvant radiation therapy, which he stopped 2 years ago.  She is currently remaining on anastrozole under the care of Dr. Alcazar.\par \par \par + Breast cancer status post lumpectomy and radiation currently on anastrozole, hypertension\par PCP is Dr. King Gaston.\par Oncologist is Dr. Atiya Alcazar. [de-identified] : 11/9/2021 - Pt presents for follow up. Has been doing well since our last visit. Denies any unilateral numbness, sensory or motor loss. No amaurosis fugax. Continues the aspirin and statin.

## 2021-11-09 NOTE — ASSESSMENT
[FreeTextEntry1] : SHAYLA RODRIGUEZ is a 71 year old female with history of left breast cancer status post lumpectomy and radiation, currently on anastrozole, and hypertension presents for evaluation of carotid stenosis.\par \par > Asymptomatic left moderate grade carotid stenosis\par –Reviewed results of duplex with you. Today, left ICA appears to have < 50% stenosis, which is overall stable. \par –Continue aspirin, statin, BP control. \par –Follow up in six months for repeat duplex.

## 2021-11-09 NOTE — PHYSICAL EXAM
[JVD] : no jugular venous distention  [Carotid Bruits] : no carotid bruits [Normal Breath Sounds] : Normal breath sounds [Respiratory Effort] : normal respiratory effort [Normal Heart Sounds] : normal heart sounds [Normal Rate and Rhythm] : normal rate and rhythm [Right Carotid Bruit] : no bruit heard over the right carotid [Left Carotid Bruit] : no bruit heard over the left carotid [No Rash or Lesion] : No rash or lesion [Calm] : calm [de-identified] : Well-appearing  [de-identified] : Insert I, CN II to XII grossly normal [de-identified] : Supple [de-identified] : Soft, nontender, nondistended  [de-identified] : Motor and sensory intact in all 4 extremities [de-identified] : Alert and oriented x4

## 2021-11-09 NOTE — DATA REVIEWED
[FreeTextEntry1] : 11/9/2021-Carotid duplex\par Right - normal ICA, vertebral is antegrade\par Left - ICA < 50%, ECA > 50%, vertebral is antegrade\par ---------------------------------\par 4/29/2021–carotid duplex\par Right–no stenosis in the internal carotid artery.\par Left–moderate stenosis (50 to 69%).\par Antegrade flow in the bilateral vertebral arteries.

## 2022-06-01 NOTE — PHYSICAL THERAPY INITIAL EVALUATION ADULT - DISCHARGE DISPOSITION, PT EVAL
Female Pregnancy Counseling Text: Female patients should also be on two forms of birth control while taking this medication and for one month after their last dose. no skilled PT needs

## 2022-06-07 ENCOUNTER — APPOINTMENT (OUTPATIENT)
Dept: VASCULAR SURGERY | Facility: CLINIC | Age: 72
End: 2022-06-07
Payer: COMMERCIAL

## 2022-06-07 VITALS — HEART RATE: 73 BPM | SYSTOLIC BLOOD PRESSURE: 162 MMHG | DIASTOLIC BLOOD PRESSURE: 89 MMHG

## 2022-06-07 VITALS
HEIGHT: 65 IN | SYSTOLIC BLOOD PRESSURE: 155 MMHG | BODY MASS INDEX: 20.33 KG/M2 | DIASTOLIC BLOOD PRESSURE: 85 MMHG | HEART RATE: 59 BPM | WEIGHT: 122 LBS | TEMPERATURE: 96.1 F

## 2022-06-07 PROCEDURE — 99213 OFFICE O/P EST LOW 20 MIN: CPT

## 2022-06-07 PROCEDURE — 93882 EXTRACRANIAL UNI/LTD STUDY: CPT

## 2022-06-07 RX ORDER — LOSARTAN POTASSIUM AND HYDROCHLOROTHIAZIDE 25; 100 MG/1; MG/1
100-25 TABLET ORAL DAILY
Refills: 0 | Status: DISCONTINUED | COMMUNITY
Start: 2018-08-28 | End: 2022-06-07

## 2022-06-07 RX ORDER — LOSARTAN POTASSIUM 50 MG/1
50 TABLET, FILM COATED ORAL
Refills: 0 | Status: ACTIVE | COMMUNITY

## 2022-06-07 RX ORDER — AMLODIPINE BESYLATE 5 MG/1
TABLET ORAL
Refills: 0 | Status: ACTIVE | COMMUNITY

## 2022-06-07 NOTE — HISTORY OF PRESENT ILLNESS
[FreeTextEntry1] : SHAYLA RODRIGUEZ is a 70 year old female who presents for evaluation of carotid stenosis.\par \par Patient was admitted in October 2020 for syncope and nausea [facility].  During her evaluation, she underwent a carotid duplex, which was significant for left-sided moderate (50 to 69%) stenosis.  She has no prior history of CVA or TIA.  She was started on aspirin and statin.  Her hospital course was also significant for hyponatremia, for which she was treated.  She was also evaluated by neurology, and was found to have no neurological event.\par \par Since her discharge, the patient has been feeling well.  She denies any symptoms of CVA or TIA.  She denies any unilateral weakness or numbness.  Denies any history of slurred speech or facial droop.  No history of amaurosis fugax.  She continues to take aspirin 81 mg and statin.\par \par Medical history is significant for left-sided breast cancer status post lumpectomy and sentinel node biopsy.  She underwent adjuvant radiation therapy, which he stopped 2 years ago.  She is currently remaining on anastrozole under the care of Dr. Alcazar.\par \par \par + Breast cancer status post lumpectomy and radiation currently on anastrozole, hypertension\par PCP is Dr. King Gaston.\par Oncologist is Dr. Atiya Alcazar.\par \par 11/9/2021 - Pt presents for follow up. Has been doing well since our last visit. Denies any unilateral numbness, sensory or motor loss. No amaurosis fugax. Continues the aspirin and statin.  [de-identified] : 6/7/2022-Pt presents for follow up. Since last visit, no unilateral numbness or weakness. No TIA, amaurosis fugax, no slurred speech. No signs or symptoms of stroke/TIA. Pt states that her blood pressure at her primary care physician ranges 120-130 systolic.

## 2022-06-07 NOTE — ASSESSMENT
[FreeTextEntry1] : SHAYLA RODRIGUEZ is a 71 year old female with history of left breast cancer status post lumpectomy and radiation, currently on anastrozole, and hypertension presents for evaluation of carotid stenosis.\par \par > Asymptomatic left moderate grade carotid stenosis\par –Patient has history of radiation therapy to left breast. Question possible etiology of left carotid stenosis secondary to radiation therapy although appears radiation area would be far removed from left carotid artery. \par –As patient remains asymptomatic, there is no indication for surgical intervention at this time.  Recommend continued use of aspirin and statin with control of blood pressure.\par –Follow-up in 1 year for repeat carotid duplex.

## 2022-06-07 NOTE — PHYSICAL EXAM
[JVD] : no jugular venous distention  [Carotid Bruits] : no carotid bruits [Right Carotid Bruit] : no bruit heard over the right carotid [Left Carotid Bruit] : no bruit heard over the left carotid [No Rash or Lesion] : No rash or lesion [Calm] : calm [de-identified] : Well-appearing  [de-identified] : Supple [de-identified] : Motor and sensory intact in all 4 extremities [de-identified] : Alert and oriented x4

## 2022-12-13 ENCOUNTER — APPOINTMENT (OUTPATIENT)
Dept: PLASTIC SURGERY | Facility: CLINIC | Age: 72
End: 2022-12-13

## 2022-12-30 ENCOUNTER — NON-APPOINTMENT (OUTPATIENT)
Age: 72
End: 2022-12-30

## 2023-03-21 ENCOUNTER — APPOINTMENT (OUTPATIENT)
Dept: PLASTIC SURGERY | Facility: CLINIC | Age: 73
End: 2023-03-21
Payer: COMMERCIAL

## 2023-03-21 VITALS
HEIGHT: 65 IN | BODY MASS INDEX: 20.83 KG/M2 | WEIGHT: 125 LBS | HEART RATE: 71 BPM | DIASTOLIC BLOOD PRESSURE: 92 MMHG | SYSTOLIC BLOOD PRESSURE: 166 MMHG

## 2023-03-21 PROCEDURE — 99213 OFFICE O/P EST LOW 20 MIN: CPT

## 2023-03-21 NOTE — CONSULT LETTER
[Dear  ___] : Dear  [unfilled], [Courtesy Letter:] : I had the pleasure of seeing your patient, [unfilled], in my office today. [Please see my note below.] : Please see my note below. [Sincerely,] : Sincerely, [DrJefferson  ___] : Dr. KHAN [DrJefferson ___] : Dr. KHAN [FreeTextEntry3] : Daisy Avila, RPA-C\par Breast Surgery\par [address]\par [address]\[place] [address]\par (Phone) [phone]\par (Fax) [phone]

## 2023-03-21 NOTE — PHYSICAL EXAM
[Normocephalic] : normocephalic [Atraumatic] : atraumatic [EOMI] : extra ocular movement intact [Sclera nonicteric] : sclera nonicteric [Supple] : supple [No Supraclavicular Adenopathy] : no supraclavicular adenopathy [Examined in the supine and seated position] : examined in the supine and seated position [Asymmetrical] : asymmetrical [No dominant masses] : no dominant masses in right breast  [No dominant masses] : no dominant masses left breast [No Nipple Retraction] : no left nipple retraction [No Nipple Discharge] : no left nipple discharge [No Axillary Lymphadenopathy] : no left axillary lymphadenopathy [No Edema] : no edema [No Rashes] : no rashes [No Ulceration] : no ulceration [de-identified] : Her left breast is slightly larger than her right. [de-identified] : Well-healed lower outer breast and axillary incisions.

## 2023-03-21 NOTE — ASSESSMENT
[FreeTextEntry1] : H/O left breast cancer (Stage IA, 7/2018)\par No evidence of disease recurrence on CBE \par \par 1. Annual bilateral mammogram and breast ultrasound due 7/2023\par 2. Follow up office visit due 9/2023\par 3. Advised monthly self breast examinations and advised her to contact me if she has any concerns.

## 2023-03-21 NOTE — HISTORY OF PRESENT ILLNESS
[FreeTextEntry1] : She has a history of Stage IA left breast cancer\par 7/2018 s/p left 4:00 Makayla  localization lumpectomy, axillary sentinel lymph node biopsy. ER+/PA+/Her2-\par Oncotype Dx recurrence score 28. intermediate risk\par Rad ONC: Dr. Palmer, completed XRT 10/10/2018\par She has a family history of breast cancer in her sister at age 43\par Genetic testing negative\par Med ONC: Dr. Stewart, on Anastrozole, saw her last month. She will complete 5 years 8/2023\par 7/26/2022 Bilateral mammogram/ultrasound: benign, BI-RADS 2\par Up to date with MDs \par She denies any current breast concerns

## 2023-03-28 ENCOUNTER — OFFICE (OUTPATIENT)
Dept: URBAN - METROPOLITAN AREA CLINIC 90 | Facility: CLINIC | Age: 73
Setting detail: OPHTHALMOLOGY
End: 2023-03-28
Payer: COMMERCIAL

## 2023-03-28 DIAGNOSIS — H35.3131: ICD-10-CM

## 2023-03-28 DIAGNOSIS — H43.812: ICD-10-CM

## 2023-03-28 DIAGNOSIS — H01.004: ICD-10-CM

## 2023-03-28 DIAGNOSIS — H16.223: ICD-10-CM

## 2023-03-28 DIAGNOSIS — H25.13: ICD-10-CM

## 2023-03-28 DIAGNOSIS — H01.001: ICD-10-CM

## 2023-03-28 PROCEDURE — 92250 FUNDUS PHOTOGRAPHY W/I&R: CPT | Performed by: OPHTHALMOLOGY

## 2023-03-28 PROCEDURE — 92014 COMPRE OPH EXAM EST PT 1/>: CPT | Performed by: OPHTHALMOLOGY

## 2023-03-28 ASSESSMENT — VISUAL ACUITY
OD_BCVA: 20/150+1
OS_BCVA: 20/50+1

## 2023-03-28 ASSESSMENT — SPHEQUIV_DERIVED
OD_SPHEQUIV: -7.25
OD_SPHEQUIV: -7.25
OS_SPHEQUIV: -7.125
OS_SPHEQUIV: -7
OS_SPHEQUIV: -7.125
OD_SPHEQUIV: -7.375

## 2023-03-28 ASSESSMENT — REFRACTION_AUTOREFRACTION
OD_AXIS: 041
OS_CYLINDER: -1.00
OS_SPHERE: -6.50
OD_SPHERE: -7.00
OS_AXIS: 146
OD_CYLINDER: -0.75

## 2023-03-28 ASSESSMENT — SUPERFICIAL PUNCTATE KERATITIS (SPK)
OD_SPK: T 1+
OS_SPK: T 1+

## 2023-03-28 ASSESSMENT — REFRACTION_MANIFEST
OD_SPHERE: -6.50
OS_ADD: +2.75
OS_VA1: 20/25-2
OD_ADD: +2.75
OU_VA: 20/25-2
OD_ADD: +2.75
OD_AXIS: 30
OD_CYLINDER: -1.50
OD_AXIS: 30
OS_AXIS: 145
OD_CYLINDER: -1.50
OS_ADD: +2.75
OS_SPHERE: -6.50
OD_VA1: 20/25-2
OS_SPHERE: -6.50
OD_VA1: 20/25-2
OU_VA: 20/25-2
OS_CYLINDER: -1.25
OS_CYLINDER: -1.25
OS_VA1: 20/25-2
OD_SPHERE: -6.50
OS_AXIS: 145

## 2023-03-28 ASSESSMENT — REFRACTION_CURRENTRX
OD_ADD: +2.75
OD_CYLINDER: -1.50
OS_AXIS: 153
OD_VPRISM_DIRECTION: PROGS
OS_OVR_VA: 20/
OS_ADD: +2.75
OD_OVR_VA: 20/
OD_CYLINDER: +1.50
OD_VPRISM_DIRECTION: PROGS
OD_SPHERE: -8.00
OS_CYLINDER: -1.25
OS_VPRISM_DIRECTION: PROGS
OS_VPRISM_DIRECTION: PROGS
OS_OVR_VA: 20/
OD_ADD: +2.25
OD_OVR_VA: 20/
OD_SPHERE: -6.50
OS_AXIS: 055
OD_AXIS: 117
OS_CYLINDER: +1.25
OS_ADD: +2.75
OS_SPHERE: -7.75
OD_AXIS: 037
OS_SPHERE: -6.50

## 2023-03-28 ASSESSMENT — KERATOMETRY
OS_K1POWER_DIOPTERS: 43.50
METHOD_AUTO_MANUAL: AUTO
OS_K2POWER_DIOPTERS: 44.50
OD_AXISANGLE_DEGREES: 118
OD_K2POWER_DIOPTERS: 45.00
OD_K1POWER_DIOPTERS: 44.00
OS_AXISANGLE_DEGREES: 062

## 2023-03-28 ASSESSMENT — AXIALLENGTH_DERIVED
OS_AL: 26.4429
OD_AL: 26.3349
OS_AL: 26.5042
OD_AL: 26.3349
OS_AL: 26.5042
OD_AL: 26.3958

## 2023-03-28 ASSESSMENT — TONOMETRY
OS_IOP_MMHG: 17
OD_IOP_MMHG: 19

## 2023-03-28 ASSESSMENT — CONFRONTATIONAL VISUAL FIELD TEST (CVF)
OD_FINDINGS: FULL
OS_FINDINGS: FULL

## 2023-03-28 ASSESSMENT — LID EXAM ASSESSMENTS
OD_BLEPHARITIS: 1+
OS_BLEPHARITIS: 1+

## 2023-06-08 ENCOUNTER — NON-APPOINTMENT (OUTPATIENT)
Age: 73
End: 2023-06-08

## 2023-06-13 ENCOUNTER — APPOINTMENT (OUTPATIENT)
Dept: VASCULAR SURGERY | Facility: CLINIC | Age: 73
End: 2023-06-13
Payer: COMMERCIAL

## 2023-06-13 PROCEDURE — 93880 EXTRACRANIAL BILAT STUDY: CPT

## 2023-06-26 ENCOUNTER — APPOINTMENT (OUTPATIENT)
Dept: VASCULAR SURGERY | Facility: CLINIC | Age: 73
End: 2023-06-26
Payer: COMMERCIAL

## 2023-06-26 DIAGNOSIS — I65.22 OCCLUSION AND STENOSIS OF LEFT CAROTID ARTERY: ICD-10-CM

## 2023-06-26 PROCEDURE — 99441: CPT

## 2023-06-26 NOTE — PHYSICAL EXAM
[Respiratory Effort] : normal respiratory effort [Calm] : calm [de-identified] : Alert and oriented x4

## 2023-06-26 NOTE — HISTORY OF PRESENT ILLNESS
[FreeTextEntry1] : SHAYLA RODRIGUEZ is a 70 year old female who presents for evaluation of carotid stenosis.\par \par Patient was admitted in October 2020 for syncope and nausea [facility].  During her evaluation, she underwent a carotid duplex, which was significant for left-sided moderate (50 to 69%) stenosis.  She has no prior history of CVA or TIA.  She was started on aspirin and statin.  Her hospital course was also significant for hyponatremia, for which she was treated.  She was also evaluated by neurology, and was found to have no neurological event.\par \par Since her discharge, the patient has been feeling well.  She denies any symptoms of CVA or TIA.  She denies any unilateral weakness or numbness.  Denies any history of slurred speech or facial droop.  No history of amaurosis fugax.  She continues to take aspirin 81 mg and statin.\par \par Medical history is significant for left-sided breast cancer status post lumpectomy and sentinel node biopsy.  She underwent adjuvant radiation therapy, which he stopped 2 years ago.  She is currently remaining on anastrozole under the care of Dr. Alcazar.\par \par \par + Breast cancer status post lumpectomy and radiation currently on anastrozole, hypertension\par PCP is Dr. King Gaston.\par Oncologist is Dr. Atiya Alcazar.\par \par 11/9/2021 - Pt presents for follow up. Has been doing well since our last visit. Denies any unilateral numbness, sensory or motor loss. No amaurosis fugax. Continues the aspirin and statin. \par \par 6/7/2022-Pt presents for follow up. Since last visit, no unilateral numbness or weakness. No TIA, amaurosis fugax, no slurred speech. No signs or symptoms of stroke/TIA. Pt states that her blood pressure at her primary care physician ranges 120-130 systolic.  [de-identified] : 6/23/2023 -since last visit, patient denies any history of unilateral numbness, weakness, slurred speech, or amaurosis fugax.  She denies any symptoms of stroke.  She continues to take aspirin 81 mg and atorvastatin 20 mg daily.  She is going to stop taking the anastrozole in August after 5 years of therapy.

## 2023-06-26 NOTE — ASSESSMENT
[FreeTextEntry1] : SHAYLA RODRIGUEZ is a 73 year old female with history of left breast cancer status post lumpectomy and radiation, currently on anastrozole, and hypertension presents for evaluation of carotid stenosis.\par \par > Asymptomatic left moderate grade carotid stenosis\par –Patient has history of radiation therapy to left breast. Question possible etiology of left carotid stenosis secondary to radiation therapy although appears radiation area would be far removed from left carotid artery. \par – Reviewed results of duplex with patient.  Duplex findings are stable with less than 50% stenosis of the left internal carotid artery.  As patient remains asymptomatic, there is no indication for surgical intervention at this time.  Recommend continued use of aspirin and statin with control of blood pressure.\par –Follow-up in 2 years for repeat carotid duplex.

## 2023-06-26 NOTE — CONSULT LETTER
[Dear  ___] : Dear  [unfilled], [Courtesy Letter:] : I had the pleasure of seeing your patient, [unfilled], in my office today. [Please see my note below.] : Please see my note below. [Consult Closing:] : Thank you very much for allowing me to participate in the care of this patient.  If you have any questions, please do not hesitate to contact me. [Sincerely,] : Sincerely, [FreeTextEntry1] : She presents for follow-up for her left carotid stenosis.  This past year, she denies any symptoms of stroke or transient ischemic attack.  She continues to take her aspirin 81 mg and atorvastatin 20 mg daily.  Her recent duplex shows stable findings of the less than 50% stenosis of the left internal carotid artery.  I reviewed these findings with the patient.  I recommend continued medical management and now will plan to see her in 2 years for repeat duplex.  I am including a copy of the results for your records. [FreeTextEntry3] : \par \par \par \par Anahy Vale MD, FACS, RPVI\par Division of Vascular & Endovascular Surgery\par [facility], Department of Surgery

## 2023-06-26 NOTE — DATA REVIEWED
[FreeTextEntry1] : 6/26/2023 - carotid duplex\par Right - no stenosis\par Left - < 50% stenosis\par -------------------------------\par 6/7/2022-left carotid duplex\par Left < 50% stenosis\par --------------------------------\par 11/9/2021-Carotid duplex\par Right - normal ICA, vertebral is antegrade\par Left - ICA < 50%, ECA > 50%, vertebral is antegrade\par ---------------------------------\par 4/29/2021–carotid duplex\par Right–no stenosis in the internal carotid artery.\par Left–moderate stenosis (50 to 69%).\par Antegrade flow in the bilateral vertebral arteries.

## 2023-06-26 NOTE — REASON FOR VISIT
[Follow-Up: _____] : a [unfilled] follow-up visit [Home] : at home, [unfilled] , at the time of the visit. [Medical Office: ([facility])___] : at the medical office located in  [Verbal consent obtained from patient] : the patient, [unfilled]

## 2023-08-03 NOTE — ED PROVIDER NOTE - CROS ED GI ALL NEG
DIAGNOSIS:  Hypertension, unspecified type    DATE RECEIVED: 08.08.2023   NOTES STATUS DETAILS   OFFICE NOTE from referring provider Internal 07.31.2023 John Garza MD    OFFICE NOTE from other specialist  Internal 07.26.2023 Olga Dowell MD    *Only VASCULITIS or LUPUS gather office notes for the following     *PULMONARY       *ENT     *DERMATOLOGY     *RHEUMATOLOGY     DISCHARGE SUMMARY from hospital     DISCHARGE REPORT from the ER     MEDICATION LIST Internal    IMAGING  (NEED IMAGES AND REPORTS)     KIDNEY CT SCAN     KIDNEY ULTRASOUND     MR ABDOMEN     NUCLEAR MEDICINE RENAL     LABS     CBC Internal 07.21.2023   CMP Internal 07.21.2023   BMP Internal 02.22.2021   UA Internal 07.21.2023   URINE PROTEIN Internal 07.21.2023   RENAL PANEL     BIOPSY     KIDNEY BIOPSY           negative...

## 2023-10-30 ENCOUNTER — NON-APPOINTMENT (OUTPATIENT)
Age: 73
End: 2023-10-30

## 2023-10-31 ENCOUNTER — APPOINTMENT (OUTPATIENT)
Dept: PLASTIC SURGERY | Facility: CLINIC | Age: 73
End: 2023-10-31
Payer: COMMERCIAL

## 2023-10-31 VITALS
DIASTOLIC BLOOD PRESSURE: 97 MMHG | HEART RATE: 71 BPM | WEIGHT: 123 LBS | OXYGEN SATURATION: 99 % | TEMPERATURE: 97.4 F | BODY MASS INDEX: 20.49 KG/M2 | SYSTOLIC BLOOD PRESSURE: 170 MMHG | HEIGHT: 65 IN

## 2023-10-31 DIAGNOSIS — Z17.0 MALIGNANT NEOPLASM OF LOWER-OUTER QUADRANT OF LEFT FEMALE BREAST: ICD-10-CM

## 2023-10-31 DIAGNOSIS — Z80.3 FAMILY HISTORY OF MALIGNANT NEOPLASM OF BREAST: ICD-10-CM

## 2023-10-31 DIAGNOSIS — C50.512 MALIGNANT NEOPLASM OF LOWER-OUTER QUADRANT OF LEFT FEMALE BREAST: ICD-10-CM

## 2023-10-31 PROCEDURE — 99213 OFFICE O/P EST LOW 20 MIN: CPT

## 2024-03-28 ENCOUNTER — RX ONLY (RX ONLY)
Age: 74
End: 2024-03-28

## 2024-03-28 ENCOUNTER — OFFICE (OUTPATIENT)
Dept: URBAN - METROPOLITAN AREA CLINIC 90 | Facility: CLINIC | Age: 74
Setting detail: OPHTHALMOLOGY
End: 2024-03-28
Payer: COMMERCIAL

## 2024-03-28 DIAGNOSIS — H01.005: ICD-10-CM

## 2024-03-28 DIAGNOSIS — H35.3131: ICD-10-CM

## 2024-03-28 DIAGNOSIS — H16.223: ICD-10-CM

## 2024-03-28 DIAGNOSIS — H01.002: ICD-10-CM

## 2024-03-28 DIAGNOSIS — H43.812: ICD-10-CM

## 2024-03-28 DIAGNOSIS — H25.13: ICD-10-CM

## 2024-03-28 DIAGNOSIS — H01.004: ICD-10-CM

## 2024-03-28 DIAGNOSIS — H01.001: ICD-10-CM

## 2024-03-28 PROCEDURE — 92014 COMPRE OPH EXAM EST PT 1/>: CPT | Performed by: OPHTHALMOLOGY

## 2024-03-28 PROCEDURE — 92134 CPTRZ OPH DX IMG PST SGM RTA: CPT | Performed by: OPHTHALMOLOGY

## 2024-03-28 ASSESSMENT — REFRACTION_MANIFEST
OU_VA: 20/25-2
OD_CYLINDER: -1.50
OS_SPHERE: -6.50
OS_SPHERE: -6.50
OD_SPHERE: -6.50
OU_VA: 20/25-2
OS_ADD: +2.75
OS_VA1: 20/25-2
OS_AXIS: 145
OD_VA1: 20/25-2
OS_ADD: +2.75
OD_ADD: +2.75
OS_VA1: 20/25-2
OD_VA1: 20/25-2
OS_CYLINDER: -1.25
OD_ADD: +2.75
OD_AXIS: 30
OS_CYLINDER: -1.25
OD_CYLINDER: -1.50
OD_AXIS: 30
OS_AXIS: 145
OD_SPHERE: -6.50

## 2024-03-28 ASSESSMENT — REFRACTION_CURRENTRX
OS_AXIS: 055
OS_CYLINDER: -1.25
OD_OVR_VA: 20/
OD_AXIS: 033
OS_VPRISM_DIRECTION: PROGS
OS_ADD: +2.75
OD_SPHERE: -6.50
OD_VPRISM_DIRECTION: PROGS
OD_CYLINDER: -1.50
OS_AXIS: 153
OD_CYLINDER: -1.50
OS_ADD: +2.75
OD_OVR_VA: 20/
OS_SPHERE: -7.75
OS_SPHERE: -6.25
OS_VPRISM_DIRECTION: PROGS
OD_ADD: +2.25
OD_AXIS: 117
OS_CYLINDER: -1.25
OD_AXIS: 037
OS_SPHERE: -6.50
OD_OVR_VA: 20/
OS_AXIS: 140
OD_SPHERE: -8.00
OS_OVR_VA: 20/
OS_OVR_VA: 20/
OD_SPHERE: -6.25
OD_VPRISM_DIRECTION: PROGS
OS_CYLINDER: +1.25
OD_ADD: +2.75
OS_OVR_VA: 20/
OD_CYLINDER: +1.50

## 2024-03-28 ASSESSMENT — SPHEQUIV_DERIVED
OD_SPHEQUIV: -7.25
OS_SPHEQUIV: -7.125
OD_SPHEQUIV: -7.25
OS_SPHEQUIV: -7.125

## 2024-03-28 ASSESSMENT — LID EXAM ASSESSMENTS
OS_BLEPHARITIS: LLL LUL 1+
OD_BLEPHARITIS: RLL RUL 1+

## 2024-12-31 ENCOUNTER — APPOINTMENT (OUTPATIENT)
Dept: PLASTIC SURGERY | Facility: CLINIC | Age: 74
End: 2024-12-31
Payer: COMMERCIAL

## 2024-12-31 DIAGNOSIS — Z17.0 MALIGNANT NEOPLASM OF LOWER-OUTER QUADRANT OF LEFT FEMALE BREAST: ICD-10-CM

## 2024-12-31 DIAGNOSIS — C50.512 MALIGNANT NEOPLASM OF LOWER-OUTER QUADRANT OF LEFT FEMALE BREAST: ICD-10-CM

## 2024-12-31 PROCEDURE — 99213 OFFICE O/P EST LOW 20 MIN: CPT

## 2025-05-01 ENCOUNTER — OFFICE (OUTPATIENT)
Facility: LOCATION | Age: 75
Setting detail: OPHTHALMOLOGY
End: 2025-05-01
Payer: COMMERCIAL

## 2025-05-01 DIAGNOSIS — H01.002: ICD-10-CM

## 2025-05-01 DIAGNOSIS — H25.13: ICD-10-CM

## 2025-05-01 DIAGNOSIS — H43.813: ICD-10-CM

## 2025-05-01 DIAGNOSIS — H16.223: ICD-10-CM

## 2025-05-01 DIAGNOSIS — H16.423: ICD-10-CM

## 2025-05-01 DIAGNOSIS — H01.005: ICD-10-CM

## 2025-05-01 DIAGNOSIS — H01.004: ICD-10-CM

## 2025-05-01 DIAGNOSIS — H01.001: ICD-10-CM

## 2025-05-01 DIAGNOSIS — H35.3131: ICD-10-CM

## 2025-05-01 PROCEDURE — 92014 COMPRE OPH EXAM EST PT 1/>: CPT | Performed by: OPHTHALMOLOGY

## 2025-05-01 PROCEDURE — 92134 CPTRZ OPH DX IMG PST SGM RTA: CPT | Performed by: OPHTHALMOLOGY

## 2025-05-01 ASSESSMENT — LID EXAM ASSESSMENTS
OD_BLEPHARITIS: RLL RUL 1+
OS_BLEPHARITIS: LLL LUL 1+

## 2025-05-01 ASSESSMENT — REFRACTION_CURRENTRX
OS_VPRISM_DIRECTION: PROGS
OD_CYLINDER: -1.50
OD_ADD: +2.25
OD_AXIS: 037
OS_VPRISM_DIRECTION: PROGS
OS_SPHERE: -6.25
OS_CYLINDER: -1.25
OS_AXIS: 142
OS_CYLINDER: -1.25
OD_SPHERE: -6.50
OD_OVR_VA: 20/
OS_AXIS: 140
OD_OVR_VA: 20/
OS_SPHERE: -6.50
OS_CYLINDER: -1.25
OD_SPHERE: -6.25
OS_AXIS: 153
OS_ADD: +1.75
OS_OVR_VA: 20/
OS_ADD: +2.75
OS_SPHERE: -6.50
OD_OVR_VA: 20/
OD_AXIS: 036
OD_ADD: +2.25
OS_OVR_VA: 20/
OS_OVR_VA: 20/
OD_SPHERE: -6.50
OD_VPRISM_DIRECTION: PROGS
OD_CYLINDER: -1.50
OD_CYLINDER: -1.50
OD_AXIS: 033
OD_VPRISM_DIRECTION: PROGS

## 2025-05-01 ASSESSMENT — REFRACTION_MANIFEST
OS_SPHERE: -6.50
OD_ADD: +2.75
OS_SPHERE: -6.50
OD_CYLINDER: -1.50
OD_SPHERE: -6.50
OD_ADD: +2.75
OD_SPHERE: -6.50
OS_VA1: 20/25-2
OU_VA: 20/25-2
OS_AXIS: 145
OD_CYLINDER: -1.50
OD_AXIS: 30
OS_VA1: 20/25-2
OD_VA1: 20/25-2
OD_AXIS: 30
OS_AXIS: 145
OS_CYLINDER: -1.25
OS_ADD: +2.75
OS_CYLINDER: -1.25
OD_VA1: 20/25-2
OU_VA: 20/25-2
OS_ADD: +2.75

## 2025-05-01 ASSESSMENT — TONOMETRY
OD_IOP_MMHG: 12
OS_IOP_MMHG: 10

## 2025-05-01 ASSESSMENT — VISUAL ACUITY
OS_BCVA: 20/40-1
OD_BCVA: 20/50

## 2025-05-01 ASSESSMENT — KERATOMETRY
OD_K2POWER_DIOPTERS: 45.25
OS_K1POWER_DIOPTERS: 43.25
OS_AXISANGLE_DEGREES: 065
OD_K1POWER_DIOPTERS: 43.75
METHOD_AUTO_MANUAL: AUTO
OD_AXISANGLE_DEGREES: 111
OS_K2POWER_DIOPTERS: 44.25

## 2025-05-01 ASSESSMENT — CONFRONTATIONAL VISUAL FIELD TEST (CVF)
OS_FINDINGS: FULL
OD_FINDINGS: FULL

## 2025-05-01 ASSESSMENT — REFRACTION_AUTOREFRACTION
OD_AXIS: 035
OS_SPHERE: -6.50
OD_CYLINDER: -0.75
OS_CYLINDER: -1.25
OD_SPHERE: -7.00
OS_AXIS: 145

## 2025-05-01 ASSESSMENT — SUPERFICIAL PUNCTATE KERATITIS (SPK)
OS_SPK: T 1+
OD_SPK: T 1+

## 2025-05-01 ASSESSMENT — VASCULARIZATION
OS_VASCULARIZATION: SUPERIOR PANNUS
OD_VASCULARIZATION: SUPERIOR PANNUS

## 2025-05-31 ENCOUNTER — NON-APPOINTMENT (OUTPATIENT)
Age: 75
End: 2025-05-31

## 2025-06-02 ENCOUNTER — APPOINTMENT (OUTPATIENT)
Dept: VASCULAR SURGERY | Facility: CLINIC | Age: 75
End: 2025-06-02
Payer: COMMERCIAL

## 2025-06-02 VITALS
SYSTOLIC BLOOD PRESSURE: 172 MMHG | HEART RATE: 73 BPM | HEIGHT: 65 IN | BODY MASS INDEX: 20.49 KG/M2 | WEIGHT: 123 LBS | DIASTOLIC BLOOD PRESSURE: 82 MMHG | OXYGEN SATURATION: 98 % | TEMPERATURE: 97.6 F

## 2025-06-02 DIAGNOSIS — I65.22 OCCLUSION AND STENOSIS OF LEFT CAROTID ARTERY: ICD-10-CM

## 2025-06-02 PROCEDURE — 99213 OFFICE O/P EST LOW 20 MIN: CPT

## 2025-06-02 PROCEDURE — 93880 EXTRACRANIAL BILAT STUDY: CPT
